# Patient Record
Sex: MALE | Race: WHITE | NOT HISPANIC OR LATINO | Employment: FULL TIME | URBAN - METROPOLITAN AREA
[De-identification: names, ages, dates, MRNs, and addresses within clinical notes are randomized per-mention and may not be internally consistent; named-entity substitution may affect disease eponyms.]

---

## 2019-02-14 ENCOUNTER — OFFICE VISIT (OUTPATIENT)
Dept: FAMILY MEDICINE CLINIC | Facility: CLINIC | Age: 29
End: 2019-02-14
Payer: COMMERCIAL

## 2019-02-14 VITALS
HEIGHT: 73 IN | SYSTOLIC BLOOD PRESSURE: 110 MMHG | OXYGEN SATURATION: 96 % | HEART RATE: 74 BPM | BODY MASS INDEX: 29.16 KG/M2 | WEIGHT: 220 LBS | DIASTOLIC BLOOD PRESSURE: 72 MMHG

## 2019-02-14 DIAGNOSIS — R06.83 SNORING: ICD-10-CM

## 2019-02-14 DIAGNOSIS — E66.3 OVERWEIGHT (BMI 25.0-29.9): ICD-10-CM

## 2019-02-14 DIAGNOSIS — Z00.00 ROUTINE GENERAL MEDICAL EXAMINATION AT A HEALTH CARE FACILITY: ICD-10-CM

## 2019-02-14 DIAGNOSIS — G25.0 BENIGN ESSENTIAL TREMOR: ICD-10-CM

## 2019-02-14 DIAGNOSIS — R22.1 LUMP ON NECK: ICD-10-CM

## 2019-02-14 DIAGNOSIS — G89.29 CHRONIC PAIN OF LEFT KNEE: Primary | ICD-10-CM

## 2019-02-14 DIAGNOSIS — Z72.0 TOBACCO USE: ICD-10-CM

## 2019-02-14 DIAGNOSIS — M25.562 CHRONIC PAIN OF LEFT KNEE: Primary | ICD-10-CM

## 2019-02-14 PROCEDURE — 99203 OFFICE O/P NEW LOW 30 MIN: CPT | Performed by: PHYSICIAN ASSISTANT

## 2019-02-14 PROCEDURE — 99395 PREV VISIT EST AGE 18-39: CPT | Performed by: PHYSICIAN ASSISTANT

## 2019-02-14 NOTE — ASSESSMENT & PLAN NOTE
Medial and lateral pain with intermittent locking for 6 months  Requested prior medical records including Xray  - MRI of left knee   Discussed that this may not be covered by insurance  - FU with orthopaedics

## 2019-02-14 NOTE — ASSESSMENT & PLAN NOTE
1 5 cm subcutaneous soft mobile and non-tender   Right of midline  - Referral for US of soft tissue on neck

## 2019-02-14 NOTE — PROGRESS NOTES
Assessment/Plan:    Chronic pain of left knee  Medial and lateral pain with intermittent locking for 6 months  Requested prior medical records including Xray  - MRI of left knee  Discussed that this may not be covered by insurance  - FU with orthopaedics    Overweight (BMI 25 0-29 9)  - Discussed importance of diet for management of weight  Encouraged both aerobic and resistance exercise 150 minutes a week  - Pt will fill out record release for prior PCP  - CBC, CMP, TSH, Lipid panel  - Return in 4 weeks to discuss labs and review prior records    Tobacco use  Pt is in the Precontemplation stage (ie, not wanting to quit)  - Educate on the negative effects of Tobaco   - Recommend quitting smoking  - Listed cessation options     Snoring  Chronic issue  Loud snoring and poor sleep quality  - Referral for Home sleep study  Benign essential tremor  Per patient, previously worked up with Neurology  - getting labs including TSH to RO thryoid    Lump on neck  1 5 cm subcutaneous soft mobile and non-tender  Right of midline  - Referral for US of soft tissue on neck    FU in 4 weeks to review labs       Discussed case with Dr Kendy Delgado    Subjective:      Patient ID: Sheila Pena is a 26 y o  male, here for an annual wellness visit and to establish care  He presents with his girlfriend  Left knee pain For 5 weeks    In [facility] in [place]  Initially started September with pop in left knee  Again noticed pain in left knee in October  Painful and swelling  His xray of left knee was normal  He deferred on getting his MRI  Diagnosed with benign essential tremor in 2012  He has a lump on the back of his neck that does not bother him and has been present for 2 years but he wants to get looked at  No change in size or pain      Previous Dental Visit: 2016 prior, no known dental issues  Previous Eye Exam: 2012 prior, no vision problems    Prior Vaccines:   - Last Tetanus vaccine: 2012    Diet: low in fruits/veg  Activity: minimal due to knee pain    The following portions of the patient's history were reviewed and updated as appropriate: allergies, current medications, past family history, past medical history, past social history, past surgical history and problem list     Review of Systems    Social History     Socioeconomic History    Marital status: Single     Spouse name: Not on file    Number of children: Not on file    Years of education: Not on file    Highest education level: Not on file   Occupational History    Not on file   Social Needs    Financial resource strain: Not on file    Food insecurity:     Worry: Not on file     Inability: Not on file    Transportation needs:     Medical: Not on file     Non-medical: Not on file   Tobacco Use    Smoking status: Current Every Day Smoker    Smokeless tobacco: Never Used    Tobacco comment: cigars <1 then one a day   Substance and Sexual Activity    Alcohol use: Yes     Frequency: Monthly or less     Drinks per session: 1 or 2     Comment: socially     Drug use: Yes     Types: Marijuana     Comment: 3 times a week    Sexual activity: Yes     Partners: Female     Birth control/protection: None   Lifestyle    Physical activity:     Days per week: Not on file     Minutes per session: Not on file    Stress: Not on file   Relationships    Social connections:     Talks on phone: Not on file     Gets together: Not on file     Attends Scientologist service: Not on file     Active member of club or organization: Not on file     Attends meetings of clubs or organizations: Not on file     Relationship status: Not on file    Intimate partner violence:     Fear of current or ex partner: Not on file     Emotionally abused: Not on file     Physically abused: Not on file     Forced sexual activity: Not on file   Other Topics Concern    Not on file   Social History Narrative    Title work         Objective:  /72   Pulse 74   Ht 6' 1" (1 854 m) Wt 99 8 kg (220 lb)   SpO2 96%   BMI 29 03 kg/m²      Physical Exam   Constitutional: He appears well-developed and well-nourished  HENT:   Head: Normocephalic and atraumatic  Right Ear: Tympanic membrane and external ear normal    Left Ear: Tympanic membrane and external ear normal    Nose: Nose normal  No rhinorrhea  Mouth/Throat: Oropharynx is clear and moist  No oropharyngeal exudate  Neck: Normal range of motion  No thyromegaly present  Cardiovascular: Normal rate, regular rhythm and normal heart sounds  Exam reveals no gallop and no friction rub  No murmur heard  Pulmonary/Chest: Effort normal and breath sounds normal  He has no wheezes  He has no rales  Abdominal: Soft  Bowel sounds are normal  He exhibits no distension  There is no tenderness  There is no rebound and no guarding  Musculoskeletal: Normal range of motion  Left knee: He exhibits normal range of motion, no swelling, no effusion, no deformity, no laceration, no LCL laxity and no bony tenderness  Tenderness found  Medial joint line and lateral joint line tenderness noted  No patellar tendon tenderness noted  Negative Lachman, Apley Distraction and Compression Test   Lymphadenopathy:        Head (right side): No submental, no submandibular, no tonsillar, no preauricular and no posterior auricular adenopathy present  Head (left side): No submental, no submandibular, no tonsillar, no preauricular and no posterior auricular adenopathy present  He has no cervical adenopathy  Psychiatric: He has a normal mood and affect  His behavior is normal  Thought content normal        BMI Counseling: Body mass index is 29 03 kg/m²  Discussed the patient's BMI with him  The BMI is above average  BMI counseling and education was provided to the patient  Nutrition recommendations include reducing portion sizes, 3-5 servings of fruits/vegetables daily, moderation in carbohydrate intake and increasing intake of lean protein  Exercise recommendations include moderate aerobic physical activity for 150 minutes/week

## 2019-03-10 ENCOUNTER — HOSPITAL ENCOUNTER (OUTPATIENT)
Dept: MRI IMAGING | Facility: HOSPITAL | Age: 29
Discharge: HOME | End: 2019-03-10
Payer: COMMERCIAL

## 2019-03-10 DIAGNOSIS — G89.29 CHRONIC PAIN OF LEFT KNEE: ICD-10-CM

## 2019-03-10 DIAGNOSIS — M25.562 CHRONIC PAIN OF LEFT KNEE: ICD-10-CM

## 2019-03-10 PROCEDURE — 73721 MRI JNT OF LWR EXTRE W/O DYE: CPT

## 2019-03-13 ENCOUNTER — APPOINTMENT (OUTPATIENT)
Dept: LAB | Facility: CLINIC | Age: 29
End: 2019-03-13
Payer: COMMERCIAL

## 2019-03-13 VITALS
HEART RATE: 99 BPM | BODY MASS INDEX: 28.1 KG/M2 | HEIGHT: 73 IN | WEIGHT: 212 LBS | SYSTOLIC BLOOD PRESSURE: 119 MMHG | DIASTOLIC BLOOD PRESSURE: 75 MMHG

## 2019-03-13 DIAGNOSIS — G89.29 CHRONIC PAIN OF LEFT KNEE: ICD-10-CM

## 2019-03-13 DIAGNOSIS — S83.242A OTHER TEAR OF MEDIAL MENISCUS, CURRENT INJURY, LEFT KNEE, INITIAL ENCOUNTER: Primary | ICD-10-CM

## 2019-03-13 DIAGNOSIS — Z00.00 ROUTINE GENERAL MEDICAL EXAMINATION AT A HEALTH CARE FACILITY: ICD-10-CM

## 2019-03-13 DIAGNOSIS — E66.3 OVERWEIGHT (BMI 25.0-29.9): ICD-10-CM

## 2019-03-13 DIAGNOSIS — M25.562 CHRONIC PAIN OF LEFT KNEE: ICD-10-CM

## 2019-03-13 DIAGNOSIS — S83.242A OTHER TEAR OF MEDIAL MENISCUS, CURRENT INJURY, LEFT KNEE, INITIAL ENCOUNTER: ICD-10-CM

## 2019-03-13 LAB
ALBUMIN SERPL BCP-MCNC: 4.2 G/DL (ref 3.5–5)
ALP SERPL-CCNC: 74 U/L (ref 46–116)
ALT SERPL W P-5'-P-CCNC: 53 U/L (ref 12–78)
ANION GAP SERPL CALCULATED.3IONS-SCNC: 10 MMOL/L (ref 4–13)
AST SERPL W P-5'-P-CCNC: 26 U/L (ref 5–45)
BASOPHILS # BLD AUTO: 0.04 THOUSANDS/ΜL (ref 0–0.1)
BASOPHILS NFR BLD AUTO: 1 % (ref 0–1)
BILIRUB SERPL-MCNC: 0.3 MG/DL (ref 0.2–1)
BUN SERPL-MCNC: 10 MG/DL (ref 5–25)
CALCIUM SERPL-MCNC: 9.5 MG/DL (ref 8.3–10.1)
CHLORIDE SERPL-SCNC: 101 MMOL/L (ref 100–108)
CO2 SERPL-SCNC: 28 MMOL/L (ref 21–32)
CREAT SERPL-MCNC: 1.02 MG/DL (ref 0.6–1.3)
EOSINOPHIL # BLD AUTO: 0.06 THOUSAND/ΜL (ref 0–0.61)
EOSINOPHIL NFR BLD AUTO: 1 % (ref 0–6)
ERYTHROCYTE [DISTWIDTH] IN BLOOD BY AUTOMATED COUNT: 12 % (ref 11.6–15.1)
GFR SERPL CREATININE-BSD FRML MDRD: 98 ML/MIN/1.73SQ M
GLUCOSE SERPL-MCNC: 85 MG/DL (ref 65–140)
HCT VFR BLD AUTO: 47.6 % (ref 36.5–49.3)
HGB BLD-MCNC: 16.2 G/DL (ref 12–17)
IMM GRANULOCYTES # BLD AUTO: 0.03 THOUSAND/UL (ref 0–0.2)
IMM GRANULOCYTES NFR BLD AUTO: 0 % (ref 0–2)
LYMPHOCYTES # BLD AUTO: 2.73 THOUSANDS/ΜL (ref 0.6–4.47)
LYMPHOCYTES NFR BLD AUTO: 35 % (ref 14–44)
MCH RBC QN AUTO: 29.3 PG (ref 26.8–34.3)
MCHC RBC AUTO-ENTMCNC: 34 G/DL (ref 31.4–37.4)
MCV RBC AUTO: 86 FL (ref 82–98)
MONOCYTES # BLD AUTO: 0.54 THOUSAND/ΜL (ref 0.17–1.22)
MONOCYTES NFR BLD AUTO: 7 % (ref 4–12)
NEUTROPHILS # BLD AUTO: 4.32 THOUSANDS/ΜL (ref 1.85–7.62)
NEUTS SEG NFR BLD AUTO: 56 % (ref 43–75)
NRBC BLD AUTO-RTO: 0 /100 WBCS
PLATELET # BLD AUTO: 288 THOUSANDS/UL (ref 149–390)
PMV BLD AUTO: 8.9 FL (ref 8.9–12.7)
POTASSIUM SERPL-SCNC: 4.1 MMOL/L (ref 3.5–5.3)
PROT SERPL-MCNC: 8.3 G/DL (ref 6.4–8.2)
RBC # BLD AUTO: 5.52 MILLION/UL (ref 3.88–5.62)
SODIUM SERPL-SCNC: 139 MMOL/L (ref 136–145)
T4 FREE SERPL-MCNC: 0.99 NG/DL (ref 0.76–1.46)
TSH SERPL DL<=0.05 MIU/L-ACNC: 3.74 UIU/ML (ref 0.36–3.74)
WBC # BLD AUTO: 7.72 THOUSAND/UL (ref 4.31–10.16)

## 2019-03-13 PROCEDURE — 84443 ASSAY THYROID STIM HORMONE: CPT

## 2019-03-13 PROCEDURE — 80053 COMPREHEN METABOLIC PANEL: CPT

## 2019-03-13 PROCEDURE — 85025 COMPLETE CBC W/AUTO DIFF WBC: CPT

## 2019-03-13 PROCEDURE — 36415 COLL VENOUS BLD VENIPUNCTURE: CPT

## 2019-03-13 PROCEDURE — 84439 ASSAY OF FREE THYROXINE: CPT

## 2019-03-13 PROCEDURE — 99204 OFFICE O/P NEW MOD 45 MIN: CPT | Performed by: ORTHOPAEDIC SURGERY

## 2019-03-13 RX ORDER — CEFAZOLIN SODIUM 2 G/50ML
2000 SOLUTION INTRAVENOUS ONCE
Status: CANCELLED | OUTPATIENT
Start: 2019-03-13 | End: 2019-03-13

## 2019-03-13 NOTE — H&P (VIEW-ONLY)
Patient Name:  Raya Rehman  MRN:  [id number]    Assessment & Plan     Left knee medial meniscus tear  1  Patient has persistent pain instability which affects his ability to perform activities of daily living an prevents him from achieving as desired lifestyle  Explained the procedure of a left knee arthroscopic partial medial meniscectomy detail as well as risks and benefits  He has elected to proceed  2  Follow up 10-14 days postoperatively  Chief Complaint     Left knee pain      History of the Present Illness     Raya Rehman is a 26 y o  male seen in consultation by orthopedics requested by Montserrat Piper for evaluation of patient's left knee  Patient began noticing left knee pain in September after twisting his knee while getting into a car  He also notes an increase in his pain while playing basketball in October when he landed awkwardly on his left knee  Initially after this injury he noted significant swelling  Since then he notes primarily medial based pain worse with walking up and down steps as well as inclines  He notes weakness and instability  He has been unable to play basketball due to pain and instability  No numbness or tingling  No fevers or chills  He did have an MRI and is here to review the results  Physical Exam     /75   Pulse 99   Ht 6' 1" (1 854 m)   Wt 96 2 kg (212 lb)   BMI 27 97 kg/m²     Left knee:  No gross deformity  Skin intact  No erythema ecchymosis or swelling  Small effusion  Tenderness to palpation posterior aspect of medial joint line  Range of motion includes full extension and flexion of 120°  Discomfort noted with terminal flexion  Stable to varus and valgus stress  Stable Lachman test   Positive Eugenio's test   Negative patellar apprehension test   Sensation intact left lower extremity  Skin warm and well perfused  Eyes:  Anicteric sclerae  Neck:  Supple  Lungs:  Unlabored breathing    Cardiovascular:  Capillary refill is less than 2 seconds  Skin:  Intact without erythema  Neurologic:  Sensation intact to light touch  Psychiatric:  Mood and affect are appropriate  Data Review     I have personally reviewed pertinent films in PACS, and my interpretation follows:    MRI of the left knee from 3/10/19 reveals a horizontal tear of the medial meniscus body and posterior horn with flipped fragment in the posterior compartment  History reviewed  No pertinent past medical history  Past Surgical History:   Procedure Laterality Date    WISDOM TOOTH EXTRACTION         No Known Allergies    No current outpatient medications on file prior to visit  No current facility-administered medications on file prior to visit  Social History     Tobacco Use    Smoking status: Current Every Day Smoker    Smokeless tobacco: Never Used    Tobacco comment: cigars <1 then one a day   Substance Use Topics    Alcohol use: Yes     Frequency: Monthly or less     Drinks per session: 1 or 2     Comment: socially     Drug use: Yes     Types: Marijuana     Comment: 3 times a week       Family History   Problem Relation Age of Onset    No Known Problems Mother     Hypertension Father     Lupus Father     No Known Problems Sister     No Known Problems Sister     Leukemia Maternal Grandfather     Leukemia Cousin        Review of Systems     As stated in the HPI  All other systems were reviewed and are negative        Scribe Attestation    I,:   Mann Livingston PA-C am acting as a scribe while in the presence of the attending physician :        I,:   Luis France MD personally performed the services described in this documentation    as scribed in my presence :

## 2019-03-13 NOTE — PROGRESS NOTES
Patient Name:  Amanda Dorantes  MRN:  [id number]    Assessment & Plan     Left knee medial meniscus tear  1  Patient has persistent pain instability which affects his ability to perform activities of daily living an prevents him from achieving as desired lifestyle  Explained the procedure of a left knee arthroscopic partial medial meniscectomy detail as well as risks and benefits  He has elected to proceed  2  Follow up 10-14 days postoperatively  Chief Complaint     Left knee pain      History of the Present Illness     Amanda Dorantes is a 26 y o  male seen in consultation by orthopedics requested by Walker Johnson for evaluation of patient's left knee  Patient began noticing left knee pain in September after twisting his knee while getting into a car  He also notes an increase in his pain while playing basketball in October when he landed awkwardly on his left knee  Initially after this injury he noted significant swelling  Since then he notes primarily medial based pain worse with walking up and down steps as well as inclines  He notes weakness and instability  He has been unable to play basketball due to pain and instability  No numbness or tingling  No fevers or chills  He did have an MRI and is here to review the results  Physical Exam     /75   Pulse 99   Ht 6' 1" (1 854 m)   Wt 96 2 kg (212 lb)   BMI 27 97 kg/m²     Left knee:  No gross deformity  Skin intact  No erythema ecchymosis or swelling  Small effusion  Tenderness to palpation posterior aspect of medial joint line  Range of motion includes full extension and flexion of 120°  Discomfort noted with terminal flexion  Stable to varus and valgus stress  Stable Lachman test   Positive Eugenio's test   Negative patellar apprehension test   Sensation intact left lower extremity  Skin warm and well perfused  Eyes:  Anicteric sclerae  Neck:  Supple  Lungs:  Unlabored breathing    Cardiovascular:  Capillary refill is less than 2 seconds  Skin:  Intact without erythema  Neurologic:  Sensation intact to light touch  Psychiatric:  Mood and affect are appropriate  Data Review     I have personally reviewed pertinent films in PACS, and my interpretation follows:    MRI of the left knee from 3/10/19 reveals a horizontal tear of the medial meniscus body and posterior horn with flipped fragment in the posterior compartment  History reviewed  No pertinent past medical history  Past Surgical History:   Procedure Laterality Date    WISDOM TOOTH EXTRACTION         No Known Allergies    No current outpatient medications on file prior to visit  No current facility-administered medications on file prior to visit  Social History     Tobacco Use    Smoking status: Current Every Day Smoker    Smokeless tobacco: Never Used    Tobacco comment: cigars <1 then one a day   Substance Use Topics    Alcohol use: Yes     Frequency: Monthly or less     Drinks per session: 1 or 2     Comment: socially     Drug use: Yes     Types: Marijuana     Comment: 3 times a week       Family History   Problem Relation Age of Onset    No Known Problems Mother     Hypertension Father     Lupus Father     No Known Problems Sister     No Known Problems Sister     Leukemia Maternal Grandfather     Leukemia Cousin        Review of Systems     As stated in the HPI  All other systems were reviewed and are negative        Scribe Attestation    I,:   Ivania Lin PA-C am acting as a scribe while in the presence of the attending physician :        I,:   Amie Mckay MD personally performed the services described in this documentation    as scribed in my presence :

## 2019-03-14 ENCOUNTER — ANESTHESIA EVENT (OUTPATIENT)
Dept: PERIOP | Facility: AMBULARY SURGERY CENTER | Age: 29
End: 2019-03-14
Payer: COMMERCIAL

## 2019-03-15 NOTE — PRE-PROCEDURE INSTRUCTIONS
No outpatient medications have been marked as taking for the 3/28/19 encounter [facility] Encounter)  Pre op and bathing instructions reviewed   Pt has hibiclens

## 2019-03-28 ENCOUNTER — HOSPITAL ENCOUNTER (OUTPATIENT)
Facility: AMBULARY SURGERY CENTER | Age: 29
Setting detail: OUTPATIENT SURGERY
Discharge: HOME | End: 2019-03-28
Attending: ORTHOPAEDIC SURGERY | Admitting: ORTHOPAEDIC SURGERY
Payer: COMMERCIAL

## 2019-03-28 ENCOUNTER — ANESTHESIA (OUTPATIENT)
Dept: PERIOP | Facility: AMBULARY SURGERY CENTER | Age: 29
End: 2019-03-28
Payer: COMMERCIAL

## 2019-03-28 VITALS
HEART RATE: 84 BPM | SYSTOLIC BLOOD PRESSURE: 123 MMHG | OXYGEN SATURATION: 97 % | HEIGHT: 73 IN | WEIGHT: 210 LBS | TEMPERATURE: 97.4 F | BODY MASS INDEX: 27.83 KG/M2 | RESPIRATION RATE: 16 BRPM | DIASTOLIC BLOOD PRESSURE: 70 MMHG

## 2019-03-28 DIAGNOSIS — S83.232A COMPLEX TEAR OF MEDIAL MENISCUS OF LEFT KNEE AS CURRENT INJURY, INITIAL ENCOUNTER: Primary | ICD-10-CM

## 2019-03-28 PROCEDURE — 29881 ARTHRS KNE SRG MNISECTMY M/L: CPT | Performed by: ORTHOPAEDIC SURGERY

## 2019-03-28 RX ORDER — MIDAZOLAM HYDROCHLORIDE 1 MG/ML
INJECTION INTRAMUSCULAR; INTRAVENOUS AS NEEDED
Status: DISCONTINUED | OUTPATIENT
Start: 2019-03-28 | End: 2019-03-28 | Stop reason: SURG

## 2019-03-28 RX ORDER — OXYCODONE HYDROCHLORIDE 5 MG/1
10 TABLET ORAL EVERY 4 HOURS PRN
Status: DISCONTINUED | OUTPATIENT
Start: 2019-03-28 | End: 2019-03-28 | Stop reason: HOSPADM

## 2019-03-28 RX ORDER — KETOROLAC TROMETHAMINE 30 MG/ML
INJECTION, SOLUTION INTRAMUSCULAR; INTRAVENOUS AS NEEDED
Status: DISCONTINUED | OUTPATIENT
Start: 2019-03-28 | End: 2019-03-28 | Stop reason: SURG

## 2019-03-28 RX ORDER — ACETAMINOPHEN 325 MG/1
650 TABLET ORAL EVERY 4 HOURS PRN
Status: DISCONTINUED | OUTPATIENT
Start: 2019-03-28 | End: 2019-03-28 | Stop reason: HOSPADM

## 2019-03-28 RX ORDER — PROPOFOL 10 MG/ML
INJECTION, EMULSION INTRAVENOUS AS NEEDED
Status: DISCONTINUED | OUTPATIENT
Start: 2019-03-28 | End: 2019-03-28 | Stop reason: SURG

## 2019-03-28 RX ORDER — FENTANYL CITRATE 50 UG/ML
INJECTION, SOLUTION INTRAMUSCULAR; INTRAVENOUS AS NEEDED
Status: DISCONTINUED | OUTPATIENT
Start: 2019-03-28 | End: 2019-03-28 | Stop reason: SURG

## 2019-03-28 RX ORDER — LIDOCAINE HYDROCHLORIDE 10 MG/ML
INJECTION, SOLUTION INFILTRATION; PERINEURAL AS NEEDED
Status: DISCONTINUED | OUTPATIENT
Start: 2019-03-28 | End: 2019-03-28 | Stop reason: SURG

## 2019-03-28 RX ORDER — ONDANSETRON 2 MG/ML
4 INJECTION INTRAMUSCULAR; INTRAVENOUS EVERY 8 HOURS PRN
Status: DISCONTINUED | OUTPATIENT
Start: 2019-03-28 | End: 2019-03-28 | Stop reason: HOSPADM

## 2019-03-28 RX ORDER — ONDANSETRON 2 MG/ML
4 INJECTION INTRAMUSCULAR; INTRAVENOUS ONCE AS NEEDED
Status: DISCONTINUED | OUTPATIENT
Start: 2019-03-28 | End: 2019-03-28 | Stop reason: HOSPADM

## 2019-03-28 RX ORDER — SODIUM CHLORIDE 9 MG/ML
125 INJECTION, SOLUTION INTRAVENOUS CONTINUOUS
Status: DISCONTINUED | OUTPATIENT
Start: 2019-03-28 | End: 2019-03-28 | Stop reason: HOSPADM

## 2019-03-28 RX ORDER — CEFAZOLIN SODIUM 2 G/50ML
SOLUTION INTRAVENOUS AS NEEDED
Status: DISCONTINUED | OUTPATIENT
Start: 2019-03-28 | End: 2019-03-28 | Stop reason: SURG

## 2019-03-28 RX ORDER — METOCLOPRAMIDE HYDROCHLORIDE 5 MG/ML
10 INJECTION INTRAMUSCULAR; INTRAVENOUS ONCE AS NEEDED
Status: DISCONTINUED | OUTPATIENT
Start: 2019-03-28 | End: 2019-03-28 | Stop reason: HOSPADM

## 2019-03-28 RX ORDER — ONDANSETRON 2 MG/ML
INJECTION INTRAMUSCULAR; INTRAVENOUS AS NEEDED
Status: DISCONTINUED | OUTPATIENT
Start: 2019-03-28 | End: 2019-03-28 | Stop reason: SURG

## 2019-03-28 RX ORDER — FENTANYL CITRATE/PF 50 MCG/ML
25 SYRINGE (ML) INJECTION
Status: DISCONTINUED | OUTPATIENT
Start: 2019-03-28 | End: 2019-03-28 | Stop reason: HOSPADM

## 2019-03-28 RX ORDER — DEXAMETHASONE SODIUM PHOSPHATE 10 MG/ML
INJECTION, SOLUTION INTRAMUSCULAR; INTRAVENOUS AS NEEDED
Status: DISCONTINUED | OUTPATIENT
Start: 2019-03-28 | End: 2019-03-28 | Stop reason: SURG

## 2019-03-28 RX ORDER — CEFAZOLIN SODIUM 2 G/50ML
2000 SOLUTION INTRAVENOUS ONCE
Status: DISCONTINUED | OUTPATIENT
Start: 2019-03-28 | End: 2019-03-28 | Stop reason: HOSPADM

## 2019-03-28 RX ORDER — OXYCODONE HYDROCHLORIDE 5 MG/1
5 TABLET ORAL EVERY 4 HOURS PRN
Qty: 25 TABLET | Refills: 0 | Status: SHIPPED | OUTPATIENT
Start: 2019-03-28 | End: 2019-09-11

## 2019-03-28 RX ADMIN — FENTANYL CITRATE 100 MCG: 50 INJECTION, SOLUTION INTRAMUSCULAR; INTRAVENOUS at 09:52

## 2019-03-28 RX ADMIN — KETOROLAC TROMETHAMINE 30 MG: 30 INJECTION, SOLUTION INTRAMUSCULAR at 10:28

## 2019-03-28 RX ADMIN — MIDAZOLAM HYDROCHLORIDE 2 MG: 1 INJECTION, SOLUTION INTRAMUSCULAR; INTRAVENOUS at 09:46

## 2019-03-28 RX ADMIN — PROPOFOL 200 MG: 10 INJECTION, EMULSION INTRAVENOUS at 09:48

## 2019-03-28 RX ADMIN — OXYCODONE HYDROCHLORIDE 10 MG: 5 TABLET ORAL at 11:26

## 2019-03-28 RX ADMIN — SODIUM CHLORIDE: 0.9 INJECTION, SOLUTION INTRAVENOUS at 09:46

## 2019-03-28 RX ADMIN — LIDOCAINE HYDROCHLORIDE ANHYDROUS 50 MG: 10 INJECTION, SOLUTION INFILTRATION at 09:47

## 2019-03-28 RX ADMIN — ONDANSETRON 4 MG: 2 INJECTION INTRAMUSCULAR; INTRAVENOUS at 09:51

## 2019-03-28 RX ADMIN — FENTANYL CITRATE 25 MCG: 50 INJECTION, SOLUTION INTRAMUSCULAR; INTRAVENOUS at 10:49

## 2019-03-28 RX ADMIN — CEFAZOLIN SODIUM 2000 MG: 2 SOLUTION INTRAVENOUS at 09:46

## 2019-03-28 RX ADMIN — DEXAMETHASONE SODIUM PHOSPHATE 5 MG: 10 INJECTION, SOLUTION INTRAMUSCULAR; INTRAVENOUS at 09:51

## 2019-03-28 RX ADMIN — FENTANYL CITRATE 25 MCG: 50 INJECTION, SOLUTION INTRAMUSCULAR; INTRAVENOUS at 10:46

## 2019-03-28 NOTE — OP NOTE
OPERATIVE REPORT  PATIENT NAME: Mikayla Schneider    :  1990  MRN: [id number]  Pt Location: AN  OR ROOM 06    SURGERY DATE: 3/28/2019    Surgeon(s) and Role:     * Cira Bal MD - Primary    Pre-Op Diagnosis Codes:     * Complex tear of medial meniscus of left knee as current injury [S83 232A]    Post-Op Diagnosis Codes:     * Complex tear of medial meniscus of left knee as current injury [S83 232A]    Procedure(s) (LRB):  LEFT KNEE ARTHROSCOPIC PARTIAL MEDIAL MENISCECTOMY    Specimen(s):  * No specimens in log *    Estimated Blood Loss:   Minimal    Drains:  * No LDAs found *    Anesthesia Type:   General    Complications:   None    Procedure and Technique:  The patient was identified in the pre-operative holding area, and the surgical site was marked by the surgeon  The patient was transported to the operating room and transferred to the OR table in the supine position  General anesthesia was administered, and the patient was intubated  The left knee was prepped and draped in the usual sterile fashion  Prophylactic antibiotics were given within 1 hour of incision  Following a surgical timeout, the portal sites were injected with 10 mL of a 1:1 mixture of 0 25% bupivacaine and 1% lidocaine with epinephrine  An anterolateral portal was established with the 15-blade scalpel  The 4 mm 30-degree arthroscopic camera was placed in the medial compartment  An anteromedial portal was established under direct visualization  The medial meniscus demonstrated a horizontal undersurface tear of the posterior horn and body with a large flipped fragment in the posterior compartment  The articular surface of the medial femoral condyle was intact  The articular surface of the medial tibial plateau was intact  Partial medial meniscectomy was performed with the basket forceps and motorized shaver  The medial meniscus was debrided back to a stable margin  The anterior cruciate ligament was intact      The lateral meniscus was intact  The articular surface of the lateral femoral condyle was intact  The articular surface of the lateral tibial plateau was intact  The articular surface of the patella was intact  The articular surface of the trochlea was intact  The knee was irrigated extensively with the arthroscopic irrigation fluid  The portal sites were closed with simple buried 3-0 Monocryl suture, and adhesive strips were applied to reinforce the skin closure  The knee was injected with 20 mL of the local anesthetic mixture  The wounds were dressed with 4 x 4 gauze, cotton padding, and an elastic bandage  Anesthesia was reversed, and the patient was extubated  The patient was transferred to the stretcher and transported to the recovery area in stable condition      Patient Disposition:  PACU  and extubated and stable    SIGNATURE: Meño Damian MD  DATE: March 28, 2019  TIME: 10:33 AM

## 2019-03-28 NOTE — ANESTHESIA PREPROCEDURE EVALUATION
Review of Systems/Medical History  Patient summary reviewed  Chart reviewed  No history of anesthetic complications     Cardiovascular  Negative cardio ROS Exercise tolerance (METS): >4,     Pulmonary  Smoker (occasional) ,   Comment: Marijuana use     GI/Hepatic    GERD (rare with large meals) ,        Negative  ROS        Endo/Other  Negative endo/other ROS      GYN       Hematology  Negative hematology ROS      Musculoskeletal  Negative musculoskeletal ROS        Neurology  Negative neurology ROS      Psychology   Negative psychology ROS            Physical Exam    Airway    Mallampati score: II  TM Distance: >3 FB  Neck ROM: full     Dental   No notable dental hx     Cardiovascular  Comment: Negative ROS,     Pulmonary      Other Findings      Lab Results   Component Value Date    WBC 7 72 03/13/2019    HGB 16 2 03/13/2019     03/13/2019     Lab Results   Component Value Date    K 4 1 03/13/2019    BUN 10 03/13/2019    CREATININE 1 02 03/13/2019     Anesthesia Plan  ASA Score- 1     Anesthesia Type- general with ASA Monitors  Additional Monitors:   Airway Plan: LMA  Plan Factors-    Induction- intravenous  Postoperative Plan-     Informed Consent- Anesthetic plan and risks discussed with patient and spouse  I personally reviewed this patient with the CRNA  Discussed and agreed on the Anesthesia Plan with the CRNA  Fariba Alcantar

## 2019-03-28 NOTE — ANESTHESIA POSTPROCEDURE EVALUATION
Post-Op Assessment Note    CV Status:  Stable  Pain Score: 0    Pain management: adequate     Mental Status:  Alert and awake   Hydration Status:  Euvolemic   PONV Controlled:  Controlled   Airway Patency:  Patent   Post Op Vitals Reviewed: Yes      Staff: CRNA           BP   114/59   Temp   98   Pulse  96   Resp   14   SpO2   96

## 2019-04-09 VITALS
HEIGHT: 73 IN | WEIGHT: 205 LBS | DIASTOLIC BLOOD PRESSURE: 74 MMHG | BODY MASS INDEX: 27.17 KG/M2 | HEART RATE: 80 BPM | SYSTOLIC BLOOD PRESSURE: 118 MMHG

## 2019-04-09 DIAGNOSIS — S83.232A COMPLEX TEAR OF MEDIAL MENISCUS OF LEFT KNEE AS CURRENT INJURY, INITIAL ENCOUNTER: Primary | ICD-10-CM

## 2019-04-09 PROCEDURE — 99024 POSTOP FOLLOW-UP VISIT: CPT | Performed by: PHYSICIAN ASSISTANT

## 2019-08-20 ENCOUNTER — HOSPITAL ENCOUNTER (OUTPATIENT)
Dept: ULTRASOUND IMAGING | Facility: HOSPITAL | Age: 29
Discharge: HOME | End: 2019-08-20
Payer: COMMERCIAL

## 2019-08-20 DIAGNOSIS — R22.1 LUMP ON NECK: ICD-10-CM

## 2019-08-20 DIAGNOSIS — L72.3 SEBACEOUS CYST: Primary | ICD-10-CM

## 2019-08-20 PROCEDURE — 76536 US EXAM OF HEAD AND NECK: CPT

## 2019-09-05 NOTE — H&P (VIEW-ONLY)
Assessment/Plan:    Diagnoses and all orders for this visit:    Skin lesion of left arm    Sebaceous cyst  -     Ambulatory referral to General Surgery    Risks and benefits for left forearm lesion as well as neck cyst excision were discussed and he agrees to proceed  Will perform this under local     Subjective:      Patient ID: John Roy is a 26 y o  male  Patient presents for evaluation of a cyst on the back of his neck  States he has had the cyst for 2 years  Denies pain or size change  Also pointed out 1 cm lesion of his left forearm  Ultrasound head and neck 8/20/2019  IMPRESSION:   Patient's palpable abnormality in the right posterior neck corresponds to a mildly complex oval lesion centered within the subcutaneous fat measuring 1 6 x 1 6 x 0 9 cm, likely representing a complex sebaceous cyst          The following portions of the patient's history were reviewed and updated as appropriate:     He  has no past medical history on file  He  has a past surgical history that includes [place] tooth extraction and pr knee scope,med/lat menisectomy (Left, 3/28/2019)  His family history includes Hypertension in his father; Leukemia in his cousin and maternal grandfather; Lupus in his father; No Known Problems in his mother, sister, and sister  He  reports that he has been smoking cigars  He has never used smokeless tobacco  He reports that he drinks alcohol  He reports that he has current or past drug history  Drug: Marijuana  Current Outpatient Medications   Medication Sig Dispense Refill    oxyCODONE (ROXICODONE) 5 mg immediate release tablet Take 1 tablet (5 mg total) by mouth every 4 (four) hours as needed for severe pain 25 tablet 0     No current facility-administered medications for this visit  He has No Known Allergies       Review of Systems      Objective:      /77 (BP Location: Left arm, Patient Position: Sitting)   Pulse 79   Resp 12   Ht 6' 1" (1 854 m)   Wt 95 3 kg (210 lb)   BMI 27 71 kg/m²          Physical Exam   Constitutional: He appears well-developed and well-nourished  Eyes: EOM are normal    Neck: Neck supple  Cardiovascular: Normal rate and regular rhythm  Pulmonary/Chest: Effort normal and breath sounds normal    Skin: Skin is warm and dry  2 cm posterior neck cyst   1 cm left forearm skin lesion

## 2019-09-09 PROBLEM — D36.7 DERMOID CYST OF NECK: Status: ACTIVE | Noted: 2019-09-09

## 2019-09-09 PROBLEM — L98.9 ARM SKIN LESION, LEFT: Status: ACTIVE | Noted: 2019-09-09

## 2019-09-11 NOTE — PRE-PROCEDURE INSTRUCTIONS
No outpatient medications have been marked as taking for [facility]      Pre op and showering instructions reviewed-Patient has hibiclens

## 2019-09-19 ENCOUNTER — HOSPITAL ENCOUNTER (OUTPATIENT)
Facility: HOSPITAL | Age: 29
Setting detail: OUTPATIENT SURGERY
Discharge: HOME | End: 2019-09-19
Attending: SURGERY | Admitting: SURGERY
Payer: COMMERCIAL

## 2019-09-19 VITALS
HEART RATE: 57 BPM | BODY MASS INDEX: 27.83 KG/M2 | RESPIRATION RATE: 17 BRPM | DIASTOLIC BLOOD PRESSURE: 64 MMHG | SYSTOLIC BLOOD PRESSURE: 134 MMHG | OXYGEN SATURATION: 97 % | HEIGHT: 73 IN | WEIGHT: 210 LBS | TEMPERATURE: 98.7 F

## 2019-09-19 DIAGNOSIS — L98.9 ARM SKIN LESION, LEFT: ICD-10-CM

## 2019-09-19 DIAGNOSIS — D36.7 DERMOID CYST OF NECK: ICD-10-CM

## 2019-09-19 PROCEDURE — 11401 EXC TR-EXT B9+MARG 0.6-1 CM: CPT | Performed by: SURGERY

## 2019-09-19 PROCEDURE — 88305 TISSUE EXAM BY PATHOLOGIST: CPT | Performed by: PATHOLOGY

## 2019-09-19 PROCEDURE — 88304 TISSUE EXAM BY PATHOLOGIST: CPT | Performed by: PATHOLOGY

## 2019-09-19 PROCEDURE — 21930 EXC BACK LES SC < 3 CM: CPT | Performed by: SURGERY

## 2019-09-19 RX ORDER — LIDOCAINE HYDROCHLORIDE AND EPINEPHRINE 10; 10 MG/ML; UG/ML
INJECTION, SOLUTION INFILTRATION; PERINEURAL AS NEEDED
Status: DISCONTINUED | OUTPATIENT
Start: 2019-09-19 | End: 2019-09-19 | Stop reason: HOSPADM

## 2019-09-19 NOTE — OP NOTE
OPERATIVE REPORT  PATIENT NAME: Rell Chavez    :  1990  MRN: [id number]  Pt Location: AN OR ROOM 02    SURGERY DATE: 2019    Surgeon(s) and Role:     * Edward Lara DO - Primary    Preop Diagnosis:  Arm skin lesion, left [L98 9]  Dermoid cyst of neck [D36 7]    Post-Op Diagnosis Codes:     * Arm skin lesion, left [L98 9]     * Dermoid cyst of neck [D36 7]    Procedure(s) (LRB):  FOREARM LESION EXCISION BIOPSY (Left)  NECK LESION/MASS EXCISION BIOPSY (N/A)    Specimen(s):  ID Type Source Tests Collected by Time Destination   1 : Left forearm Lesion Tissue Arm, Left TISSUE EXAM Edward Lara DO 2019 1008    2 : Neck Lesion Tissue Neck TISSUE EXAM Edward Lara DO 2019 1009        Estimated Blood Loss:   Minimal    Drains:  * No LDAs found *    Anesthesia Type:   Local    Operative Indications:  Arm skin lesion, left [L98 9]  Dermoid cyst of neck [D36 7]      Operative Findings:  2 cm posterior neck cyst   1 cm left forearm lesion    Complications:   None    Procedure and Technique:   patient was brought into the operative suite and placed prone on the OR table  Were able to expose both neck and left forearm areas for excision of their respective lesions  Both areas were prepped and draped with alcohol  Time-out was performed was assured that the prep was dry  Local 1% lidocaine with epinephrine was instilled in the skin subcutaneous tissues of the neck lesion  Elliptical skin incision was made and the cyst was removed using scalpel blade  No specific margin was taken  Few bleeding points were controlled hot cautery  Three 0 Vicryl was used to close subcutaneous tissues  Four Monocryl was used to close skin in a subcuticular fashion  Another time-out was performed in the left forearm lesion was addressed  Local was instilled the skin the surrounding subcutaneous tissues  Elliptical skin incision was made with no specific margin    Full-thickness skin lesion was removed down the subcutaneous tissues with the scalpel blade  Few bleeding points were controlled hot cautery  Three 0 Vicryl was used to close subcutaneous tissues  Four Monocryl was used to close skin in a subcuticular fashion  Wounds washed and dried and sterile skin glue was applied to both  He tolerated well and was returned to the recovery area in stable condition     I was present for the entire procedure    Patient Disposition:  PACU     SIGNATURE: Leeanna Rabago DO  DATE: September 19, 2019  TIME: 10:22 AM

## 2019-09-19 NOTE — DISCHARGE INSTRUCTIONS
Apply ice as needed to wounds     may apply a bandage if he noticed drainage     may shower     call Dr Mike Mars with questions or concerns    [phone]

## 2019-11-05 ENCOUNTER — OFFICE VISIT (OUTPATIENT)
Dept: FAMILY MEDICINE CLINIC | Facility: CLINIC | Age: 29
End: 2019-11-05
Payer: COMMERCIAL

## 2019-11-05 VITALS
WEIGHT: 211 LBS | SYSTOLIC BLOOD PRESSURE: 120 MMHG | DIASTOLIC BLOOD PRESSURE: 70 MMHG | HEIGHT: 73 IN | OXYGEN SATURATION: 98 % | RESPIRATION RATE: 16 BRPM | HEART RATE: 78 BPM | BODY MASS INDEX: 27.96 KG/M2

## 2019-11-05 DIAGNOSIS — L98.9 ARM SKIN LESION, LEFT: Primary | ICD-10-CM

## 2019-11-05 PROCEDURE — 99213 OFFICE O/P EST LOW 20 MIN: CPT | Performed by: PHYSICIAN ASSISTANT

## 2019-11-05 PROCEDURE — 3008F BODY MASS INDEX DOCD: CPT | Performed by: PHYSICIAN ASSISTANT

## 2019-11-05 NOTE — PROGRESS NOTES
Assessment/Plan:     Diagnoses and all orders for this visit:    Arm skin lesion, left  Minimal irritation of well healing surgical incision on left forearm  6 weeks post-operative  No evidence of cellulitis or abscess  - Directed to continue covering incision while cleaning to prevent exposure to irritating chemicals  - Directed to RTC with any development of redness, return of discharge, fever or chills  Directed to FU for annual physical in 3 months          Subjective:    Patient ID: Otto Tamayo is a 26 y o  male  Pt is presenting today for Concern for infection of left forearm incision site  He had a benign dermatofibroma removed from forearm on 6 weeks ago with no complications  3 days ago he noticed slight tenderness with trace white drainage from incision site  He has been applying topical neosporin and covering it with bandages since this occurred  He cleaned a house the day prior to the tenderness  He is right hand dominant  Denies any fever, chills or localized redness  The following portions of the patient's history were reviewed and updated as appropriate: allergies, current medications and problem list     Review of Systems   Constitutional: Negative for activity change, fatigue, fever and unexpected weight change  HENT: Negative for rhinorrhea and sore throat  Respiratory: Negative for cough, shortness of breath and wheezing  Cardiovascular: Negative for chest pain, palpitations and leg swelling  Gastrointestinal: Negative for constipation, diarrhea, nausea and vomiting  Musculoskeletal: Negative for back pain, neck pain and neck stiffness  Skin: Negative for rash  Objective:  /70   Pulse 78   Resp 16   Ht 6' 1" (1 854 m)   Wt 95 7 kg (211 lb)   SpO2 98%   BMI 27 84 kg/m²      Physical Exam   Constitutional: He is oriented to person, place, and time  He appears well-developed and well-nourished  No distress     HENT:   Head: Normocephalic and atraumatic  Cardiovascular: Normal rate, regular rhythm, normal heart sounds and intact distal pulses  Exam reveals no gallop and no friction rub  No murmur heard  Pulmonary/Chest: Effort normal and breath sounds normal  No respiratory distress  He has no wheezes  Neurological: He is alert and oriented to person, place, and time  Skin: Skin is warm and dry  Laceration noted  He is not diaphoretic  Psychiatric: He has a normal mood and affect  His behavior is normal  Thought content normal    Vitals reviewed

## 2021-04-14 DIAGNOSIS — Z23 ENCOUNTER FOR IMMUNIZATION: ICD-10-CM

## 2021-04-30 ENCOUNTER — IMMUNIZATIONS (OUTPATIENT)
Dept: FAMILY MEDICINE CLINIC | Facility: HOSPITAL | Age: 31
End: 2021-04-30

## 2021-04-30 DIAGNOSIS — Z23 ENCOUNTER FOR IMMUNIZATION: Primary | ICD-10-CM

## 2021-04-30 PROCEDURE — 0001A SARS-COV-2 / COVID-19 MRNA VACCINE (PFIZER-BIONTECH) 30 MCG: CPT

## 2021-04-30 PROCEDURE — 91300 SARS-COV-2 / COVID-19 MRNA VACCINE (PFIZER-BIONTECH) 30 MCG: CPT

## 2021-05-22 ENCOUNTER — IMMUNIZATIONS (OUTPATIENT)
Dept: FAMILY MEDICINE CLINIC | Facility: HOSPITAL | Age: 31
End: 2021-05-22

## 2021-05-22 DIAGNOSIS — Z23 ENCOUNTER FOR IMMUNIZATION: Primary | ICD-10-CM

## 2021-05-22 PROCEDURE — 91300 SARS-COV-2 / COVID-19 MRNA VACCINE (PFIZER-BIONTECH) 30 MCG: CPT

## 2021-05-22 PROCEDURE — 0002A SARS-COV-2 / COVID-19 MRNA VACCINE (PFIZER-BIONTECH) 30 MCG: CPT

## 2022-03-31 ENCOUNTER — OFFICE VISIT (OUTPATIENT)
Dept: FAMILY MEDICINE CLINIC | Facility: CLINIC | Age: 32
End: 2022-03-31
Payer: COMMERCIAL

## 2022-03-31 VITALS
HEART RATE: 87 BPM | SYSTOLIC BLOOD PRESSURE: 108 MMHG | RESPIRATION RATE: 16 BRPM | BODY MASS INDEX: 26.11 KG/M2 | WEIGHT: 197 LBS | OXYGEN SATURATION: 97 % | DIASTOLIC BLOOD PRESSURE: 68 MMHG | HEIGHT: 73 IN

## 2022-03-31 DIAGNOSIS — R53.83 FATIGUE, UNSPECIFIED TYPE: ICD-10-CM

## 2022-03-31 DIAGNOSIS — R06.83 SNORING: ICD-10-CM

## 2022-03-31 DIAGNOSIS — R42 DIZZINESS: Primary | ICD-10-CM

## 2022-03-31 DIAGNOSIS — G25.0 BENIGN ESSENTIAL TREMOR: ICD-10-CM

## 2022-03-31 DIAGNOSIS — Z13.6 SCREENING FOR CARDIOVASCULAR CONDITION: ICD-10-CM

## 2022-03-31 DIAGNOSIS — Z11.4 SCREENING FOR HIV (HUMAN IMMUNODEFICIENCY VIRUS): ICD-10-CM

## 2022-03-31 PROBLEM — S83.232A COMPLEX TEAR OF MEDIAL MENISCUS OF LEFT KNEE AS CURRENT INJURY: Status: RESOLVED | Noted: 2019-03-13 | Resolved: 2022-03-31

## 2022-03-31 PROBLEM — M25.562 CHRONIC PAIN OF LEFT KNEE: Status: RESOLVED | Noted: 2019-02-14 | Resolved: 2022-03-31

## 2022-03-31 PROBLEM — G89.29 CHRONIC PAIN OF LEFT KNEE: Status: RESOLVED | Noted: 2019-02-14 | Resolved: 2022-03-31

## 2022-03-31 PROBLEM — R22.1 LUMP ON NECK: Status: RESOLVED | Noted: 2019-02-14 | Resolved: 2022-03-31

## 2022-03-31 PROCEDURE — 93000 ELECTROCARDIOGRAM COMPLETE: CPT | Performed by: FAMILY MEDICINE

## 2022-03-31 PROCEDURE — 99204 OFFICE O/P NEW MOD 45 MIN: CPT | Performed by: FAMILY MEDICINE

## 2022-03-31 NOTE — PROGRESS NOTES
Assessment/Plan:    Problem List Items Addressed This Visit        Nervous and Auditory    Benign essential tremor     Coarse tremors of both upper extremities since teenager ,advised to see neurologist         Relevant Orders    CBC and differential    TSH, 3rd generation with Free T4 reflex    Ambulatory Referral to Neurology    Vitamin B12       Other    Snoring    Relevant Orders    Ambulatory Referral to Sleep Medicine    Dizziness - Primary     ekg normal, most likely postural hypotension , advised to drink more fluids daily          Relevant Orders    CBC and differential    Comprehensive metabolic panel    TSH, 3rd generation with Free T4 reflex    Lipid panel    POCT ECG (Completed)    Ambulatory Referral to Sleep Medicine    Ambulatory Referral to Neurology    Vitamin B12    Fatigue    Relevant Orders    Ambulatory Referral to Sleep Medicine    Lyme Total Antibody Profile with reflex to WB    Ambulatory Referral to Neurology    Screening for HIV (human immunodeficiency virus)    Relevant Orders    HIV 1/2 Antigen/Antibody (4th Generation) w Reflex [facility]      EKG normal    Return in about 2 weeks (around 4/14/2022)  Chief Complaint   Patient presents with    Dizziness       Subjective:   Patient ID: Justice Stein is a 34 y o  male  new pt , very long hx of intermittent feeling black out /dizzy , mostly with position change , he says sitting immediately help to improve , no pass out,  Also c/l fatigue all the time ,  Hand and arm tremors mostly with activity,this is also since he was teen ager   No lyme ds,         HPI    Review of Systems   Constitutional: Positive for fatigue  Negative for activity change, appetite change, chills, fever and unexpected weight change  HENT: Negative for congestion, ear discharge, ear pain, nosebleeds, postnasal drip, rhinorrhea, sinus pressure, sneezing, sore throat, trouble swallowing and voice change      Eyes: Negative for photophobia, pain, discharge, redness and itching  Respiratory: Negative for cough, chest tightness, shortness of breath and wheezing  Cardiovascular: Negative for chest pain, palpitations and leg swelling  Gastrointestinal: Negative for abdominal pain, constipation, diarrhea, nausea and vomiting  Endocrine: Negative for polyuria  Genitourinary: Negative for dysuria, frequency and urgency  Musculoskeletal: Negative for arthralgias, back pain, myalgias and neck pain  Skin: Negative for color change, pallor and rash  Allergic/Immunologic: Negative for environmental allergies and food allergies  Neurological: Positive for dizziness and tremors  Negative for seizures, syncope, facial asymmetry, speech difficulty, weakness, light-headedness, numbness and headaches  Hematological: Negative for adenopathy  Does not bruise/bleed easily  Psychiatric/Behavioral: Negative for behavioral problems  The patient is not nervous/anxious  Objective:  Physical Exam  Vitals and nursing note reviewed  Constitutional:       Appearance: He is well-developed  He is not ill-appearing  HENT:      Head: Normocephalic and atraumatic  Right Ear: External ear normal       Left Ear: External ear normal       Nose: Nose normal  No rhinorrhea  Mouth/Throat:      Pharynx: No oropharyngeal exudate or posterior oropharyngeal erythema  Eyes:      General: No scleral icterus  Right eye: No discharge  Left eye: No discharge  Conjunctiva/sclera: Conjunctivae normal       Pupils: Pupils are equal, round, and reactive to light  Neck:      Thyroid: No thyromegaly  Trachea: No tracheal deviation  Cardiovascular:      Rate and Rhythm: Normal rate and regular rhythm  Heart sounds: Normal heart sounds  No murmur heard  Pulmonary:      Effort: Pulmonary effort is normal  No respiratory distress  Breath sounds: Normal breath sounds  No wheezing or rales     Abdominal:      General: Bowel sounds are normal  There is no distension  Palpations: Abdomen is soft  There is no mass  Tenderness: There is no abdominal tenderness  There is no rebound  Musculoskeletal:         General: No deformity  Normal range of motion  Cervical back: Normal range of motion and neck supple  Right lower leg: No edema  Left lower leg: No edema  Lymphadenopathy:      Cervical: No cervical adenopathy  Skin:     General: Skin is warm  Coloration: Skin is not pale  Findings: No erythema or rash  Neurological:      Mental Status: He is alert and oriented to person, place, and time  Cranial Nerves: No cranial nerve deficit  Sensory: No sensory deficit  Motor: No weakness  Coordination: Coordination normal       Gait: Gait normal       Deep Tendon Reflexes: Reflexes are normal and symmetric  Reflexes normal       Comments: coarse tremors of both hands and arms    Psychiatric:         Behavior: Behavior normal          Thought Content: Thought content normal          Judgment: Judgment normal             Past Surgical History:   Procedure Laterality Date    FACIAL/NECK BIOPSY N/A 9/19/2019    Procedure: NECK LESION/MASS EXCISION BIOPSY;  Surgeon: Carolann Workman DO;  [facility];  Service: General    MASS EXCISION Left 9/19/2019    Procedure: FOREARM LESION EXCISION BIOPSY;  Surgeon: Carolann Workman DO;  [facility];  Service: General    CT KNEE SCOPE,MED/LAT MENISECTOMY Left 3/28/2019    Procedure: LEFT KNEE ARTHROSCOPIC PARTIAL MEDIAL MENISCECTOMY;  Surgeon: Lyndsey Berg MD;  [facility];  Service: Orthopedics    WISDOM TOOTH EXTRACTION         Family History   Problem Relation Age of Onset    No Known Problems Mother     Hypertension Father    [facility] Lupus Father     No Known Problems Sister     No Known Problems Sister     Leukemia Maternal Grandfather     Leukemia Cousin        No current outpatient medications on file      No Known Allergies    Vitals: 03/31/22 0845 03/31/22 0848   BP: 110/60 108/68   Pulse: 87    Resp: 16    SpO2: 97%    Weight: 89 4 kg (197 lb)    Height: 6' 1" (1 854 m)

## 2022-04-13 ENCOUNTER — APPOINTMENT (OUTPATIENT)
Dept: LAB | Facility: CLINIC | Age: 32
End: 2022-04-13
Payer: COMMERCIAL

## 2022-04-13 DIAGNOSIS — R42 DIZZINESS: ICD-10-CM

## 2022-04-13 DIAGNOSIS — G25.0 BENIGN ESSENTIAL TREMOR: ICD-10-CM

## 2022-04-13 DIAGNOSIS — Z11.4 SCREENING FOR HIV (HUMAN IMMUNODEFICIENCY VIRUS): ICD-10-CM

## 2022-04-13 DIAGNOSIS — Z13.6 SCREENING FOR CARDIOVASCULAR CONDITION: ICD-10-CM

## 2022-04-13 DIAGNOSIS — R53.83 FATIGUE, UNSPECIFIED TYPE: ICD-10-CM

## 2022-04-13 LAB
ALBUMIN SERPL BCP-MCNC: 4 G/DL (ref 3.5–5)
ALP SERPL-CCNC: 80 U/L (ref 46–116)
ALT SERPL W P-5'-P-CCNC: 37 U/L (ref 12–78)
ANION GAP SERPL CALCULATED.3IONS-SCNC: 4 MMOL/L (ref 4–13)
AST SERPL W P-5'-P-CCNC: 21 U/L (ref 5–45)
B BURGDOR IGG+IGM SER-ACNC: 1
BASOPHILS # BLD AUTO: 0.06 THOUSANDS/ΜL (ref 0–0.1)
BASOPHILS NFR BLD AUTO: 1 % (ref 0–1)
BILIRUB SERPL-MCNC: 0.25 MG/DL (ref 0.2–1)
BUN SERPL-MCNC: 14 MG/DL (ref 5–25)
CALCIUM SERPL-MCNC: 9.6 MG/DL (ref 8.3–10.1)
CHLORIDE SERPL-SCNC: 106 MMOL/L (ref 100–108)
CHOLEST SERPL-MCNC: 194 MG/DL
CO2 SERPL-SCNC: 26 MMOL/L (ref 21–32)
CREAT SERPL-MCNC: 1.1 MG/DL (ref 0.6–1.3)
EOSINOPHIL # BLD AUTO: 0.29 THOUSAND/ΜL (ref 0–0.61)
EOSINOPHIL NFR BLD AUTO: 3 % (ref 0–6)
ERYTHROCYTE [DISTWIDTH] IN BLOOD BY AUTOMATED COUNT: 12.7 % (ref 11.6–15.1)
GFR SERPL CREATININE-BSD FRML MDRD: 87 ML/MIN/1.73SQ M
GLUCOSE P FAST SERPL-MCNC: 86 MG/DL (ref 65–99)
HCT VFR BLD AUTO: 48.2 % (ref 36.5–49.3)
HDLC SERPL-MCNC: 30 MG/DL
HGB BLD-MCNC: 15.8 G/DL (ref 12–17)
IMM GRANULOCYTES # BLD AUTO: 0.03 THOUSAND/UL (ref 0–0.2)
IMM GRANULOCYTES NFR BLD AUTO: 0 % (ref 0–2)
LDLC SERPL CALC-MCNC: 122 MG/DL (ref 0–100)
LYMPHOCYTES # BLD AUTO: 3.89 THOUSANDS/ΜL (ref 0.6–4.47)
LYMPHOCYTES NFR BLD AUTO: 40 % (ref 14–44)
MCH RBC QN AUTO: 29.5 PG (ref 26.8–34.3)
MCHC RBC AUTO-ENTMCNC: 32.8 G/DL (ref 31.4–37.4)
MCV RBC AUTO: 90 FL (ref 82–98)
MONOCYTES # BLD AUTO: 0.81 THOUSAND/ΜL (ref 0.17–1.22)
MONOCYTES NFR BLD AUTO: 8 % (ref 4–12)
NEUTROPHILS # BLD AUTO: 4.63 THOUSANDS/ΜL (ref 1.85–7.62)
NEUTS SEG NFR BLD AUTO: 48 % (ref 43–75)
NONHDLC SERPL-MCNC: 164 MG/DL
NRBC BLD AUTO-RTO: 0 /100 WBCS
PLATELET # BLD AUTO: 282 THOUSANDS/UL (ref 149–390)
PMV BLD AUTO: 8.9 FL (ref 8.9–12.7)
POTASSIUM SERPL-SCNC: 4.2 MMOL/L (ref 3.5–5.3)
PROT SERPL-MCNC: 7.8 G/DL (ref 6.4–8.2)
RBC # BLD AUTO: 5.36 MILLION/UL (ref 3.88–5.62)
SODIUM SERPL-SCNC: 136 MMOL/L (ref 136–145)
TRIGL SERPL-MCNC: 212 MG/DL
TSH SERPL DL<=0.05 MIU/L-ACNC: 3.89 UIU/ML (ref 0.45–4.5)
VIT B12 SERPL-MCNC: 494 PG/ML (ref 100–900)
WBC # BLD AUTO: 9.71 THOUSAND/UL (ref 4.31–10.16)

## 2022-04-13 PROCEDURE — 80061 LIPID PANEL: CPT

## 2022-04-13 PROCEDURE — 87389 HIV-1 AG W/HIV-1&-2 AB AG IA: CPT

## 2022-04-13 PROCEDURE — 36415 COLL VENOUS BLD VENIPUNCTURE: CPT

## 2022-04-13 PROCEDURE — 84443 ASSAY THYROID STIM HORMONE: CPT

## 2022-04-13 PROCEDURE — 80053 COMPREHEN METABOLIC PANEL: CPT

## 2022-04-13 PROCEDURE — 85025 COMPLETE CBC W/AUTO DIFF WBC: CPT

## 2022-04-13 PROCEDURE — 82607 VITAMIN B-12: CPT

## 2022-04-13 PROCEDURE — 86618 LYME DISEASE ANTIBODY: CPT

## 2022-04-14 ENCOUNTER — OFFICE VISIT (OUTPATIENT)
Dept: FAMILY MEDICINE CLINIC | Facility: CLINIC | Age: 32
End: 2022-04-14
Payer: COMMERCIAL

## 2022-04-14 VITALS
HEIGHT: 73 IN | RESPIRATION RATE: 16 BRPM | BODY MASS INDEX: 26.11 KG/M2 | WEIGHT: 197 LBS | OXYGEN SATURATION: 96 % | DIASTOLIC BLOOD PRESSURE: 70 MMHG | HEART RATE: 91 BPM | SYSTOLIC BLOOD PRESSURE: 118 MMHG

## 2022-04-14 DIAGNOSIS — G25.0 BENIGN ESSENTIAL TREMOR: ICD-10-CM

## 2022-04-14 DIAGNOSIS — R42 DIZZINESS: ICD-10-CM

## 2022-04-14 DIAGNOSIS — R06.83 SNORING: ICD-10-CM

## 2022-04-14 DIAGNOSIS — E78.2 MIXED HYPERLIPIDEMIA: Primary | ICD-10-CM

## 2022-04-14 LAB — HIV 1+2 AB+HIV1 P24 AG SERPL QL IA: NORMAL

## 2022-04-14 PROCEDURE — 99213 OFFICE O/P EST LOW 20 MIN: CPT | Performed by: FAMILY MEDICINE

## 2022-04-14 NOTE — ASSESSMENT & PLAN NOTE
Discussed about his labs HDL is slightly low triglyceride elevated and LDL slightly elevated, he will work on low-fat diet and advised to have yearly physical and labs
Even increasing fluid intake did not improve his dizziness, he is drinking almost 60 oz of water daily and he has a long term problem her labs are not showing any significant abnormality
Has made appointment with Neurology, B12 level is normal, but lower level ,discussed with him , whether  he should take the B12 supplement, will discuss with his neurologist  Lab Results   Component Value Date    Eliza Brown 494 04/13/2022
He snores, he feels fatigue during daytime, he has made appointment with the sleep specialist for evaluation for any sleep apnea
No

## 2022-04-14 NOTE — PROGRESS NOTES
Assessment/Plan:    Problem List Items Addressed This Visit        Nervous and Auditory    Benign essential tremor     Has made appointment with Neurology, B12 level is normal, but lower level ,discussed with him , whether  he should take the B12 supplement, will discuss with his neurologist  Lab Results   Component Value Date    Gaston Lu 494 04/13/2022                 Other    Snoring     He snores, he feels fatigue during daytime, he has made appointment with the sleep specialist for evaluation for any sleep apnea         Dizziness     Even increasing fluid intake did not improve his dizziness, he is drinking almost 60 oz of water daily and he has a long term problem her labs are not showing any significant abnormality         Mixed hyperlipidemia - Primary     Discussed about his labs HDL is slightly low triglyceride elevated and LDL slightly elevated, he will work on low-fat diet and advised to have yearly physical and labs               No follow-ups on file  Chief Complaint   Patient presents with    Follow-up       Subjective:   Patient ID: Aly Whitt is a 34 y o  male  He is here for follow-up on his labs, he has long-term issue off arm tremors, fatigue, dizziness almost daily with the position change, he has increased his fluid intake as his blood pressure was on lower side last time and thought it was due to his low blood pressure he get positional hypertension, but even increasing fluid intake does not improve the dizziness  He has made appointment with neurologist and the sleep specialist    HPI    Review of Systems   Constitutional: Positive for fatigue  HENT: Negative  Eyes: Negative  Respiratory: Negative  Cardiovascular: Negative  Gastrointestinal: Negative  Musculoskeletal: Negative  Skin: Negative  Neurological: Positive for dizziness  Psychiatric/Behavioral: Positive for sleep disturbance  Objective:  Physical Exam  Vitals and nursing note reviewed  Constitutional:       Appearance: Normal appearance  He is well-developed  He is not ill-appearing  HENT:      Head: Normocephalic and atraumatic  Eyes:      General: No scleral icterus  Right eye: No discharge  Left eye: No discharge  Extraocular Movements: Extraocular movements intact  Neck:      Thyroid: No thyromegaly  Trachea: No tracheal deviation  Cardiovascular:      Rate and Rhythm: Normal rate and regular rhythm  Heart sounds: Normal heart sounds  No murmur heard  Pulmonary:      Effort: Pulmonary effort is normal  No respiratory distress  Breath sounds: Normal breath sounds  No wheezing or rales  Musculoskeletal:         General: Normal range of motion  Cervical back: Normal range of motion and neck supple  Right lower leg: No edema  Left lower leg: No edema  Lymphadenopathy:      Cervical: No cervical adenopathy  Skin:     General: Skin is warm  Coloration: Skin is not pale  Findings: No erythema or rash  Neurological:      Mental Status: He is alert and oriented to person, place, and time  Deep Tendon Reflexes: Reflexes are normal and symmetric  Psychiatric:         Behavior: Behavior normal          Thought Content:  Thought content normal          Judgment: Judgment normal             Past Surgical History:   Procedure Laterality Date    FACIAL/NECK BIOPSY N/A 9/19/2019    Procedure: NECK LESION/MASS EXCISION BIOPSY;  Surgeon: Pola Perez DO;  [facility];  Service: General    MASS EXCISION Left 9/19/2019    Procedure: FOREARM LESION EXCISION BIOPSY;  Surgeon: Pola Perez DO;  [facility];  Service: General    AR KNEE SCOPE,MED/LAT MENISECTOMY Left 3/28/2019    Procedure: LEFT KNEE ARTHROSCOPIC PARTIAL MEDIAL MENISCECTOMY;  Surgeon: Isa Paulino MD;  [facility];  Service: Orthopedics    WISDOM TOOTH EXTRACTION         Family History   Problem Relation Age of Onset    No Known Problems Mother     Hypertension Father    [facility] Lupus Father     No Known Problems Sister     No Known Problems Sister     Leukemia Maternal Grandfather     Leukemia Cousin        No current outpatient medications on file      No Known Allergies    Vitals:    04/14/22 0839   BP: 118/70   Pulse: 91   Resp: 16   SpO2: 96%   Weight: 89 4 kg (197 lb)   Height: 6' 1" (1 854 m)

## 2022-04-25 ENCOUNTER — CONSULT (OUTPATIENT)
Dept: NEUROLOGY | Facility: CLINIC | Age: 32
End: 2022-04-25
Payer: COMMERCIAL

## 2022-04-25 VITALS
WEIGHT: 205 LBS | DIASTOLIC BLOOD PRESSURE: 80 MMHG | BODY MASS INDEX: 27.17 KG/M2 | TEMPERATURE: 91 F | SYSTOLIC BLOOD PRESSURE: 100 MMHG | HEIGHT: 73 IN | HEART RATE: 80 BPM

## 2022-04-25 DIAGNOSIS — R53.83 FATIGUE, UNSPECIFIED TYPE: ICD-10-CM

## 2022-04-25 DIAGNOSIS — H93.8X2 EAR FULLNESS, LEFT: Primary | ICD-10-CM

## 2022-04-25 DIAGNOSIS — G25.0 BENIGN ESSENTIAL TREMOR: ICD-10-CM

## 2022-04-25 DIAGNOSIS — R42 DIZZINESS: ICD-10-CM

## 2022-04-25 PROCEDURE — 99205 OFFICE O/P NEW HI 60 MIN: CPT | Performed by: STUDENT IN AN ORGANIZED HEALTH CARE EDUCATION/TRAINING PROGRAM

## 2022-04-25 PROCEDURE — 3008F BODY MASS INDEX DOCD: CPT | Performed by: STUDENT IN AN ORGANIZED HEALTH CARE EDUCATION/TRAINING PROGRAM

## 2022-04-25 RX ORDER — PRIMIDONE 50 MG/1
25 TABLET ORAL
Qty: 30 TABLET | Refills: 2 | Status: SHIPPED | OUTPATIENT
Start: 2022-04-25 | End: 2022-10-22

## 2022-04-25 NOTE — PATIENT INSTRUCTIONS
Essential Tremor   AMBULATORY CARE:   An essential tremor  is uncontrolled muscle shaking that has no known cause  Your healthcare provider can diagnose essential tremor based on your signs and symptoms  Other tests may be used to make sure another condition is not causing the tremors  Essential tremors can happen at any age but are most common after 36years of age  Common signs and symptoms of essential tremor:  Signs and symptoms may be mild or severe  You may have tremors when you try to hold still or when you move  Any of the following may get worse slowly, over time:  · Hands or arms shake, especially when you hold or reach for objects    · Voice quivers when you speak    · Legs shake when you stand still    · Trouble controlling your hands or arms, holding objects, or writing    · Trouble doing daily activities such as brushing your teeth or shaving    · Head nodding or shaking you cannot control    · Shaking brought on or made worse by stress, fatigue, or chemicals such as caffeine    · A feeling of shaking or trembling inside, even if your body does not shake    Contact your healthcare provider if:   · You have new or worsening symptoms  · You have questions or concerns about your condition or care  Treatment  may not be needed  If your tremors are severe or keep you from doing daily activities, the following may help:  · Medicines  may be given to control tremors so you can do your normal activities more easily  Medicines will not completely control or stop the tremors, but they may help  · Deep brain stimulation  is a procedure used if other treatments do not control your tremors  Electric stimulation is given to parts of the brain that control movement  The stimulation stops those parts of the brain from working  · Surgery  may be used if your tremors are severe and medicines have not helped  Surgery may be used to destroy part of your thalamus   The thalamus is a part of the brain that control movement and coordination  Manage your symptoms:   · Go to occupational therapy as directed  An occupational therapist can help you find new ways to do your daily activities  For example, you may learn to use wrist weights during activities such as brushing your teeth  The weights can help steady your arms and hands  · Use assistive devices as directed  Weighted utensils can help you control your movements as you eat  Adaptive equipment, such as a iPointer mouse, for the computer can help make computers easier to use  It may also help to install voice recognition software on your computer  The software allows you to talk instead of using the keyboard to type  Electric appliances such as can openers and toothbrushes can make daily living activities easier  Ask your healthcare provider or occupational therapist for more information on assistive devices  · Wear clothing that is easy to put on and take off  Examples include shoes that do not need laces and shirts without buttons  You can also get a device to help you get buttons through the button holes  · Do not have caffeine  Caffeine can make tremors worse  · Do not smoke  Nicotine and other chemicals in cigarettes and cigars can make tremors worse  Ask your healthcare provider for information if you currently smoke and need help to quit  E-cigarettes or smokeless tobacco still contain nicotine  Talk to your healthcare provider before you use these products  · Manage stress  Stress can trigger tremors or make them worse  Find ways to manage stress, such as deep breathing, listening to music, or getting a massage  Talk to your healthcare provider if you need help to control anxiety  · Ask about medicines  Some medicines can make tremors worse  An example is cold medicines  Talk to your healthcare provider about all the medicines you take  · Keep a record of your tremors    Include when the tremors happened, and how long they lasted  List anything you think might have triggered the tremors or made them stop  It might be helpful to include any foods or drinks you had that contained caffeine or were unusual for you  Bring the record with you to your follow-up appointments  Follow up with your healthcare provider as directed: You may be referred to a neurologist (nerve specialist)  Write down your questions so you remember to ask them during your visits  © Copyright [employer] 2022 Information is for End User's use only and may not be sold, redistributed or otherwise used for commercial purposes  All illustrations and images included in CareNotes® are the copyrighted property of A D A M , Inc  or "[employer]   The above information is an  only  It is not intended as medical advice for individual conditions or treatments  Talk to your doctor, nurse or pharmacist before following any medical regimen to see if it is safe and effective for you  Primidone (By mouth)   Primidone (PRIM-i-done)  Treats seizure disorders  Brand Name(s): Mysoline   There may be other brand names for this medicine  When This Medicine Should Not Be Used: This medicine is not right for everyone  Do not use it if you had an allergic reaction to primidone or phenobarbital or if you have porphyria  How to Use This Medicine:   Liquid, Tablet  · Take your medicine as directed  Your dose may need to be changed several times to find what works best for you     · Shake the oral liquid well just before each dose  Measure the oral liquid medicine with a marked measuring spoon, oral syringe, or medicine cup  · Plan your doses so they are evenly spaced during the day  Talk with your doctor or pharmacist if you have questions about when to take this medicine  · This medicine should come with a Medication Guide  Ask your pharmacist for a copy if you do not have one  · Missed dose: Take a dose as soon as you remember   If it is almost time for your next dose, wait until then and take a regular dose  Do not take extra medicine to make up for a missed dose  · Store the medicine in a closed container at room temperature, away from heat, moisture, and direct light  Do not freeze  Drugs and Foods to Avoid:   Ask your doctor or pharmacist before using any other medicine, including over-the-counter medicines, vitamins, and herbal products  · Tell your doctor if you are also taking birth control pills  · Tell your doctor if you use anything else that makes you sleepy  Some examples are allergy medicine, narcotic pain medicine, and alcohol  Warnings While Using This Medicine:   · Tell your doctor if you are pregnant or if you have kidney disease or a history of depression  · Mothers who are breastfeeding: The medicine will be in your breast milk  Stop breastfeeding if your baby becomes unusually tired or sleepy  · This medicine can increase depression and thoughts of suicide  Tell your doctor right away if you start to feel more depressed or have thoughts about hurting yourself  · This medicine may make you dizzy or drowsy  Do not drive or do anything else that could be dangerous until you know how this medicine affects you  · Your doctor will do lab tests at regular visits to check on the effects of this medicine  Keep all appointments  · Do not stop using this medicine suddenly  Your doctor will need to slowly decrease your dose before you stop it completely  · Keep all medicine out of the reach of children  Never share your medicine with anyone    Possible Side Effects While Using This Medicine:   Call your doctor right away if you notice any of these side effects:  · Allergic reaction: Itching or hives, swelling in your face or hands, swelling or tingling in your mouth or throat, chest tightness, trouble breathing  · Severe dizziness, sleepiness, or trouble walking  · Thoughts of hurting yourself, depression, unusual changes in behavior  If you notice these less serious side effects, talk with your doctor:   · Dizziness  · Weakness, clumsiness  If you notice other side effects that you think are caused by this medicine, tell your doctor  Call your doctor for medical advice about side effects  You may report side effects to FDA at 6-432-FDA-4667    © Copyright [address] 2022 Information is for End User's use only and may not be sold, redistributed or otherwise used for commercial purposes  The above information is an  only  It is not intended as medical advice for individual conditions or treatments  Talk to your doctor, nurse or pharmacist before following any medical regimen to see if it is safe and effective for you

## 2022-04-25 NOTE — PROGRESS NOTES
[address] Neurology Consult  PATIENT:  Justice Stein  MRN:  [id number]  :  1990  DATE OF SERVICE:  2022  REFERRED BY: Jamie Mcknight MD  PMD: Gretchen Lee MD    Assessment/Plan:     Justice Stein is a very pleasant  34 y o  male with no significant PMHx who presents for evaluation of tremors and dizziness  On exam today, has action/positional tremor which is not present at rest  Exam findings, time course of symptoms, and alleviation with alcohol are consistent with a diagnosis of essential tremor  He has tried propranolol in the past which was ineffective, but with questionable compliance  His presyncopal episodes are likely vasovagal, but since they are now also occurring at times while he is at rest he may benefit from further evaluation for possible cardiac causes  #essential tremor  -primidone 25 mg at bedtime  Will uptitrate to symptom relief as tolerated up to 75 mg daily  - counseled patient that caffeine and tobacco use may worsen his tremor    #presyncope  - orthostatic BP obtained in office today - sitting 110/80->standing 100/80  - ECG performed 3/31/22 - normal sinus rhythm  - consider cards eval and w/u for potential cardiac causes of pre-syncope    #ear fullness and diminished hearing on the L  - ENT referral     Follow-up  - f/u in 3 months    CC:   Tremor and dizziness    History of Present Illness:      34 y o  male with no significant PMHx who presents for evaluation of tremors and dizziness  His tremor started at the end of college, around age 21-23  Notices it most often in his hands but it also sometimes occurs in his legs  These tremors worsen with action and movement  He used to drink more heavily during college, and at that time did notice an improvement in his tremor with alcohol intake   He states that he tried propranolol for these symptoms in the past  He does not think that it was particularly effective; however, he had poor adherence due to frequent drinking at that time  He states that he has had dizzy spells since he was very young, but started to have them more often around 6-7 weeks ago  States that these episodes happen most often when he is transitioning from a sitting to a standing position; however, around 6 weeks ago they also started to occur occasionally when he was seated  He describes feeling lightheaded, his vision goes gray, starts to have a metallic taste in his mouth with tinnitus  He has never lost consciousness with these episodes because he usually sits down and waits for the symptoms to subside before attempting to get up and walk again  Symptoms last ~20 s  He also notices episodes of L ear fullness and decreased hearing in his L ear which started around the same time that his episodes of dizziness increased in frequency  Denies any vertigo associated with these symptoms or otherwise  LIFESTYLE  Sleep   - averages: 7 hrs  Problems falling asleep?:   No  Problems staying asleep?:  No    Water: 60 oz jugs of water per day  Caffeine: 3 cups of coffee    Mood:   Endorses anxiety    The following portions of the patient's history were reviewed and updated as appropriate: allergies, current medications, past family history, past medical history, past social history, past surgical history and problem list     Pertinent social history:  Work: works in [employer], works in real estate transactions  Illicit Drugs: denies  Alcohol/tobacco: smokes cigars, 2-3 per week    Past Medical History:   No past medical history on file  Patient Active Problem List   Diagnosis    Benign essential tremor    Snoring    Overweight (BMI 25 0-29  9)    Tobacco use    Arm skin lesion, left    Dermoid cyst of neck    Dizziness    Fatigue    Screening for HIV (human immunodeficiency virus)    Mixed hyperlipidemia       Medications:      No current outpatient medications on file  No current facility-administered medications for this visit  Allergies:    No Known Allergies    Family History:     Family History   Problem Relation Age of Onset    No Known Problems Mother     Hypertension Father     Lupus Father     No Known Problems Sister     No Known Problems Sister     Leukemia Maternal Grandfather     Leukemia Cousin        Social History:       Social History     Socioeconomic History    Marital status: Single     Spouse name: Not on file    Number of children: Not on file    Years of education: Not on file    Highest education level: Not on file   Occupational History    Not on file   Tobacco Use    Smoking status: Current Every Day Smoker     Years: 2 00     Types: Cigars    Smokeless tobacco: Never Used    Tobacco comment: cigars <1 then one a day   Substance and Sexual Activity    Alcohol use: Yes     Comment: Socially    Drug use: Yes     Types: Marijuana     Comment: 3-4 times a week    Sexual activity: Yes     Partners: Female     Birth control/protection: None   Other Topics Concern    Not on file   Social History Narrative    Title work     Social Determinants of Health     Financial Resource Strain: Not on file   Food Insecurity: Not on file   Transportation Needs: Not on file   Physical Activity: Not on file   Stress: Not on file   Social Connections: Not on file   Intimate Partner Violence: Not on file   Housing Stability: Not on file         Objective:   /80 (BP Location: Left arm, Patient Position: Sitting, Cuff Size: Standard)   Pulse 80   Temp (!) 91 °F (32 8 °C)   Ht 6' 1" (1 854 m)   Wt 93 kg (205 lb)   BMI 27 05 kg/m²    Orthostatic BP: sitting 110/80->standing 100/80     General: Patient is not in any acute/apparent distress, well nourished, well developed and cooperative  HEENT: normocephalic, atraumatic, moist membranes  Neck: supple  Heart: regular heart rate and rhythm, no murmurs, rubs and or gallops  Chest: Clear to auscultation bilaterally  Abdomen: soft and non-tender     Extremities: no edema noted   Skin: no lesions or rash  Musculosketal: no bony abnormalities    Neurologic Examination:   Mental status: alert, awake, oriented X 3 and following commands  Speech/Language: Speech is fluent without any dysarthria, no aphasia noted, can name, repeat, read and write and comprehension intact    Cranial Nerves:   CN I: smell not tested  CN II: Visual fields full to confrontation, fundus - no papilledema noted  CN III, IV, VI: Extraocular movements intact bilaterally  Pupils equal round and reactive to light bilaterally  CN V: Facial sensation is normal   CN VII: Full and symmetric facial movement  CN VIII: Hearing is normal   CN IX, X: Palate elevates symmetrically  Normal gag reflex  CN XI: Shoulder shrug strength is normal   CN XII: Tongue midline without atrophy or fasciculations  Motor:   Strength 5/5 in all 4 extremities  No pronator drift  Normal rapid alternating movements  Bulk/tone - normal   Fasiculations - none  No tremors noted at rest  Postural tremor when holding up arms L>R  Sensory:   Sensation intact to soft touch, vibration and pinprick in all 4 extremities  Cerebellar:   Intention tremor on finger to nose  normal heel to shin  Reflexes: 3+ bilateral patellars, 2+ biceps/triceps/ankle  Pathologic reflexes - babinski reflex negative    Gait:   Normal gait, able to tandem walk, able to tip-toe, able to walk on heels       Review of Systems:     Review of Systems   Constitutional: Negative  Negative for appetite change and fever  HENT: Positive for tinnitus (Left ear)  Negative for hearing loss, trouble swallowing and voice change  Eyes: Negative  Negative for photophobia and pain  Respiratory: Negative  Negative for shortness of breath  Cardiovascular: Negative  Negative for palpitations  Gastrointestinal: Negative  Negative for nausea and vomiting  Endocrine: Negative  Negative for cold intolerance  Genitourinary: Negative    Negative for dysuria, frequency and urgency  Musculoskeletal: Negative  Negative for myalgias and neck pain  Skin: Negative  Negative for rash  Neurological: Positive for dizziness, tremors (In both and hands and sometimes in his legs), light-headedness and headaches (sometimes)  Negative for seizures, syncope, facial asymmetry, speech difficulty, weakness and numbness  Hematological: Negative  Does not bruise/bleed easily  Psychiatric/Behavioral: Negative  Negative for confusion, hallucinations and sleep disturbance  I have spent 35 minutes with Patient today in which greater than 50% of this time was spent in counseling/coordination of care regarding Risks and benefits of tx options, Intructions for management, Risk factor reductions and Impressions  I also spent 30 minutes non face to face for this patient the same day         Author:  Sonu Jerez MD 4/25/2022 8:12 AM

## 2022-08-01 ENCOUNTER — OFFICE VISIT (OUTPATIENT)
Dept: NEUROLOGY | Facility: CLINIC | Age: 32
End: 2022-08-01
Payer: COMMERCIAL

## 2022-08-01 VITALS
DIASTOLIC BLOOD PRESSURE: 88 MMHG | WEIGHT: 199.6 LBS | HEIGHT: 73 IN | SYSTOLIC BLOOD PRESSURE: 120 MMHG | HEART RATE: 72 BPM | BODY MASS INDEX: 26.45 KG/M2

## 2022-08-01 DIAGNOSIS — G25.0 TREMOR, ESSENTIAL: Primary | ICD-10-CM

## 2022-08-01 PROCEDURE — 99213 OFFICE O/P EST LOW 20 MIN: CPT | Performed by: STUDENT IN AN ORGANIZED HEALTH CARE EDUCATION/TRAINING PROGRAM

## 2022-08-01 RX ORDER — TOPIRAMATE 25 MG/1
25 TABLET ORAL DAILY
Qty: 90 TABLET | Refills: 3 | Status: SHIPPED | OUTPATIENT
Start: 2022-08-01

## 2022-08-01 NOTE — PATIENT INSTRUCTIONS
Patient Instructions: It was a pleasure seeing you again today! In a couple months time, I would like for you to trial primidone again taking it slightly earlier in the evening to see if you are less groggy in the morning  If you continue to have side effects, or the medication in ineffective, I am giving you a script for topamax to try  If you start topamax, you can start at 25 mg at night and can increase after 1 week up to 50 mg at night  F/u in 5 months

## 2022-08-01 NOTE — PROGRESS NOTES
[address] Neurology Consult  PATIENT:  Marie Lynn  MRN:  [id number]  :  1990  DATE OF SERVICE:  2022  REFERRED BY: No ref  provider found  PMD: Lucy Pierce MD    Assessment/Plan:     Marie Lynn is a very pleasant  34 y o  male with no significant PMHx who presents for f/u of tremors and presyncope    #essential tremor  - can trial primidone 25 mg earlier in the evening (8-9 pm)  He would like to delay this trial until the fall when he is less busy at work   - if ineffective or still intolerable side effects, can trial topamax 25 mg at night  Avoiding propranolol due to baseline complaints of presyncope   - counseled patient that caffeine and tobacco use may worsen his tremor    #presyncope  - ECG performed 3/31/22 - normal sinus rhythm  - consider cards eval and w/u for potential cardiac causes of pre-syncope  - advised goal hydration 2 L daily    #ear fullness and diminished hearing on the L  - ENT referral - previous referral placed but pt not contacted, will give phone number to schedule    Follow-up  - f/u in 3 months    CC:   Tremor and dizziness    History of Present Illness:   Interval Hx:  Tried primidone, made him too groggy in the morning, particularly when he increased up to 50 mg at night  He was taking the medication at around 11 pm  Since then has discontinued because he was too sleepy during work hours, which was increasing his stress  Previous Hx:   34 y o  male with no significant PMHx who presents for evaluation of tremors and dizziness  His tremor started at the end of college, around age 21-23  Notices it most often in his hands but it also sometimes occurs in his legs  These tremors worsen with action and movement  He used to drink more heavily during college, and at that time did notice an improvement in his tremor with alcohol intake   He states that he tried propranolol for these symptoms in the past  He does not think that it was particularly effective; however, he had poor adherence due to frequent drinking at that time  He states that he has had dizzy spells since he was very young, but started to have them more often around 6-7 weeks ago  States that these episodes happen most often when he is transitioning from a sitting to a standing position; however, around 6 weeks ago they also started to occur occasionally when he was seated  He describes feeling lightheaded, his vision goes gray, starts to have a metallic taste in his mouth with tinnitus  He has never lost consciousness with these episodes because he usually sits down and waits for the symptoms to subside before attempting to get up and walk again  Symptoms last ~20 s  He also notices episodes of L ear fullness and decreased hearing in his L ear which started around the same time that his episodes of dizziness increased in frequency  Denies any vertigo associated with these symptoms or otherwise  LIFESTYLE  Sleep   - averages: 7 hrs  Problems falling asleep?:   No  Problems staying asleep?:  No    Water: 60 oz jugs of water per day  Caffeine: 3 cups of coffee    Mood:   Endorses anxiety    The following portions of the patient's history were reviewed and updated as appropriate: allergies, current medications, past family history, past medical history, past social history, past surgical history and problem list     Pertinent social history:  Work: works in [employer], works in real estate transactions  Illicit Drugs: denies  Alcohol/tobacco: smokes cigars, 2-3 per week    Past Medical History:   History reviewed  No pertinent past medical history  Patient Active Problem List   Diagnosis    Benign essential tremor    Snoring    Overweight (BMI 25 0-29  9)    Tobacco use    Arm skin lesion, left    Dermoid cyst of neck    Dizziness    Fatigue    Screening for HIV (human immunodeficiency virus)    Mixed hyperlipidemia       Medications:      Current Outpatient Medications   Medication Sig Dispense Refill    primidone (MYSOLINE) 50 mg tablet Take 0 5 tablets (25 mg total) by mouth daily at bedtime After 1 week, can increase to 1 tablet at bedtime  After 2 weeks, can increase to 1 5 tablets at bedtime  (Patient not taking: Reported on 8/1/2022) 30 tablet 2     No current facility-administered medications for this visit          Allergies:    No Known Allergies    Family History:     Family History   Problem Relation Age of Onset    No Known Problems Mother     Hypertension Father     Lupus Father     No Known Problems Sister     No Known Problems Sister     Leukemia Maternal Grandfather     Leukemia Cousin        Social History:       Social History     Socioeconomic History    Marital status: Single     Spouse name: Not on file    Number of children: Not on file    Years of education: Not on file    Highest education level: Not on file   Occupational History    Not on file   Tobacco Use    Smoking status: Current Every Day Smoker     Years: 2 00     Types: Cigars    Smokeless tobacco: Never Used    Tobacco comment: cigars <1 then one a day   Substance and Sexual Activity    Alcohol use: Yes     Comment: Socially    Drug use: Yes     Types: Marijuana     Comment: 3-4 times a week    Sexual activity: Yes     Partners: Female     Birth control/protection: None   Other Topics Concern    Not on file   Social History Narrative    Title work     Social Determinants of Health     Financial Resource Strain: Not on file   Food Insecurity: Not on file   Transportation Needs: Not on file   Physical Activity: Not on file   Stress: Not on file   Social Connections: Not on file   Intimate Partner Violence: Not on file   Housing Stability: Not on file         Objective:   /88 (BP Location: Left arm, Patient Position: Sitting, Cuff Size: Standard)   Pulse 72   Ht 6' 1" (1 854 m)   Wt 90 5 kg (199 lb 9 6 oz)   BMI 26 33 kg/m²      General: Patient is not in any acute/apparent distress, well nourished, well developed and cooperative  HEENT: normocephalic, atraumatic, moist membranes  Neck: supple  Extremities: no edema noted   Skin: no lesions or rash  Musculosketal: no bony abnormalities    Neurologic Examination:   Mental status: alert, awake, oriented X 3 and following commands  Speech/Language: Speech is fluent without any dysarthria, no aphasia noted, can name, repeat, read and write and comprehension intact    Cranial Nerves:   CN I: smell not tested  CN II: Visual fields full to confrontation, fundus - no papilledema noted  CN III, IV, VI: Extraocular movements intact bilaterally  Pupils equal round and reactive to light bilaterally  CN V: Facial sensation is normal   CN VII: Full and symmetric facial movement  CN VIII: Hearing is normal  CN IX, X: Palate elevates symmetrically  CN XI: Shoulder shrug strength is normal   CN XII: Tongue midline without atrophy or fasciculations  Motor:   Strength 5/5 in all 4 extremities  No pronator drift  Normal rapid alternating movements  Bulk/tone - normal   Fasiculations - none  No tremors noted at rest  Postural tremor when holding up arms L>R  Fine action tremor R>L    Sensory:   Sensation intact to soft touch, vibration and pinprick in all 4 extremities  Cerebellar:   Intention tremor on finger to nose  normal heel to shin  Reflexes: 3+ bilateral patellars, 2+ biceps/triceps/ankle  Pathologic reflexes - babinski reflex negative    Gait:   Normal casual gait       Review of Systems:     Review of Systems   Constitutional: Negative  Negative for appetite change and fever  HENT: Positive for tinnitus (Left ear)  Negative for hearing loss, trouble swallowing and voice change  Eyes: Negative  Negative for photophobia and pain  Respiratory: Negative  Negative for shortness of breath  Cardiovascular: Negative  Negative for palpitations  Gastrointestinal: Negative  Negative for nausea and vomiting  Endocrine: Negative  Negative for cold intolerance  Genitourinary: Negative  Negative for dysuria, frequency and urgency  Musculoskeletal: Negative  Negative for myalgias and neck pain  Skin: Negative  Negative for rash  Neurological: Positive for dizziness, tremors (In both and hands and sometimes in his legs), light-headedness and headaches (sometimes)  Negative for seizures, syncope, facial asymmetry, speech difficulty, weakness and numbness  Hematological: Negative  Does not bruise/bleed easily  Psychiatric/Behavioral: Negative  Negative for confusion, hallucinations and sleep disturbance  I have spent 20 minutes with Patient today in which greater than 50% of this time was spent in counseling/coordination of care regarding Risks and benefits of tx options, Intructions for management, Risk factor reductions and Impressions  I also spent 10 minutes non face to face for this patient the same day

## 2022-09-12 ENCOUNTER — OFFICE VISIT (OUTPATIENT)
Dept: PULMONOLOGY | Facility: CLINIC | Age: 32
End: 2022-09-12
Payer: COMMERCIAL

## 2022-09-12 VITALS
TEMPERATURE: 97.8 F | DIASTOLIC BLOOD PRESSURE: 68 MMHG | WEIGHT: 195 LBS | HEIGHT: 73 IN | HEART RATE: 63 BPM | SYSTOLIC BLOOD PRESSURE: 100 MMHG | RESPIRATION RATE: 16 BRPM | OXYGEN SATURATION: 96 % | BODY MASS INDEX: 25.84 KG/M2

## 2022-09-12 DIAGNOSIS — G47.50 PARASOMNIA, UNSPECIFIED TYPE: ICD-10-CM

## 2022-09-12 DIAGNOSIS — R29.818 SUSPECTED SLEEP APNEA: Primary | ICD-10-CM

## 2022-09-12 PROCEDURE — 99244 OFF/OP CNSLTJ NEW/EST MOD 40: CPT | Performed by: INTERNAL MEDICINE

## 2022-09-12 NOTE — LETTER
September 12, 2022     Gina Caputo MD  [address]    Patient: Israel Farmer   YOB: 1990   Date of Visit: 9/12/2022       Dear Dr Souleymane Gregory:    Thank you for referring Mary Pedraza to me for evaluation  Below are my notes for this consultation  If you have questions, please do not hesitate to call me  I look forward to following your patient along with you  Sincerely,        Danna Thomas MD        CC: No Recipients  Danna Thomas MD  9/12/2022  2:14 PM  Attested  Sleep Consultation   Israel Farmer 34 y o  male MRN: [id number]      Reason for consultation: Snoring, fatigue    Requesting physician: Gina Caputo MD    Assessment/Plan     34 y o  male past medical history benign essential tremor presents for evaluation of snoring  1  Snoring/witnessed apneas   [place] score of 5   BMI 25 7, Mallampati 4Neck Circumference: 15 5  He is at risk for obstructive sleep apnea based on STOP BANG survey, as he has snoring, has excessive tiredness, male gender,  I discussed in depth the diagnostic studies and treatment options involved with obstructive sleep apnea  I also discussed in depth the risk of leaving sleep apnea untreated including hypertension, heart failure, arrhythmia, MI and stroke  The patient is agreeable to undergo testing and treatment of obstructive sleep apnea  He understands the pitfalls he may encounter along the way and is willing to attempt CPAP treatment  Plan   Ordered extended diagnostic polysomnogram  Follow-up after results     2   Parasomnia  Patient states there were regular episodes of sleep paralysis when he was younger, last episode of sleep paralysis 2-3 years ago   Denies cataplexy   Relates associated episodes of hallucinations where he sees movement and hears things that on their  Denies episodes of inappropriate sleep  Patient does have daytime sleepiness and takes a nap twice a week    Plan  Ordered extended diagnostic polysomnogram as patient may have coexistent parasomnia possible narcolepsy  Patient may need MSLT if any concerning findings  Follow-up after results          History of Present Illness   HPI:  Arlen Mendoza is a 34 y o  male past medical history benign essential tremor presents for evaluation of snoring  Patient has snored his whole life  His wife says that he snores loudly  Snoring is worse when he is sleeping on his back  His wife has seen episodes where he stops breathing and gasping for air  He has some daytime sleepiness and takes naps twice a week  [place] score of 5  He also complains of daytime fatigue that is been going on for the past 10 years  He is being worked up for the fatigue by his primary care physician  He recently had Lyme and HIV test which were negative  He also has blood pressure on the lower side and has frequent episodes of lightheadedness upon standing  He states they have been occurring since childhood  He wakes up with dry mouth  He states he has unrefreshed sleep  Patient reports episodes of sleep paralysis in the past   He states the last 1 was a couple years ago  They used to occur regularly as a teenager but not as much recently  He states that during these episodes he would see movement or hear things that were not there  Sleep history: Patient goes to bed at 10:00 p m  and falls asleep by 11:00 p m  He wakes up at 7:00 a m   No nighttime awakenings  He takes 2 naps a week, usually in the afternoon  He takes no medications to help him sleep  He drinks 20 oz cough coffee daily, rarely drinks alcohol, smokes 3 cigars a week      He uses marijuana vape 1-2 puffs after work daily      Review of Systems      Genitourinary none   Cardiology none   Gastrointestinal none   Neurology frequent headaches   Constitutional none   Integumentary none   Psychiatry anxiety   Musculoskeletal none   Pulmonary snoring   ENT ringing in ears   Endocrine none   Hematological none               Historical Information   No past medical history on file    Past Surgical History:   Procedure Laterality Date    FACIAL/NECK BIOPSY N/A 9/19/2019    Procedure: NECK LESION/MASS EXCISION BIOPSY;  Surgeon: Ramón Mcfarland DO;  [facility];  Service: General    MASS EXCISION Left 9/19/2019    Procedure: FOREARM LESION EXCISION BIOPSY;  Surgeon: Ramón Mcfarland DO;  [facility];  Service: General    [place] KNEE SCOPE,MED/LAT MENISECTOMY Left 3/28/2019    Procedure: LEFT KNEE ARTHROSCOPIC PARTIAL MEDIAL MENISCECTOMY;  Surgeon: Chelsea Buchanan MD;  [facility];  Service: Orthopedics    WISDOM TOOTH EXTRACTION       Family History   Problem Relation Age of Onset    No Known Problems Mother     Hypertension Father    Kat Riis Lupus Father     No Known Problems Sister     No Known Problems Sister     Leukemia Maternal Grandfather     Leukemia Cousin      Social History     Socioeconomic History    Marital status: Single     Spouse name: Not on file    Number of children: Not on file    Years of education: Not on file    Highest education level: Not on file   Occupational History    Not on file   Tobacco Use    Smoking status: Current Every Day Smoker     Years: 2 00     Types: Cigars    Smokeless tobacco: Never Used    Tobacco comment: cigars <1 then one a day   Vaping Use    Vaping Use: Never used   Substance and Sexual Activity    Alcohol use: Yes     Comment: Socially    Drug use: Yes     Types: Marijuana     Comment: 3-4 times a week    Sexual activity: Yes     Partners: Female     Birth control/protection: None   Other Topics Concern    Not on file   Social History Narrative    Title work     Social Determinants of Health     Financial Resource Strain: Not on file   Food Insecurity: Not on file   Transportation Needs: Not on file   Physical Activity: Not on file   Stress: Not on file   Social Connections: Not on file   Intimate Partner Violence: Not on file Housing Stability: Not on file       Occupational History:     Meds/Allergies   No Known Allergies    Home medications:  Prior to Admission medications    Medication Sig Start Date End Date Taking? Authorizing Provider   primidone (MYSOLINE) 50 mg tablet Take 0 5 tablets (25 mg total) by mouth daily at bedtime After 1 week, can increase to 1 tablet at bedtime  After 2 weeks, can increase to 1 5 tablets at bedtime  Patient not taking: No sig reported 4/25/22 10/22/22  Ria Turner MD   topiramate (Topamax) 25 mg tablet Take 1 tablet (25 mg total) by mouth daily  Patient not taking: Reported on 9/12/2022 8/1/22   Ria Turner MD       Vitals:   Blood pressure 100/68, pulse 63, temperature 97 8 °F (36 6 °C), resp  rate 16, height 6' 1" (1 854 m), weight 88 5 kg (195 lb), SpO2 96 %  Body mass index is 25 73 kg/m²  Neck Circumference: 15 5    Physical Exam  General: Awake alert and oriented x 3, conversant without conversational dyspnea, NAD, normal affect  HEENT:  PERRL, Sclera noninjected, nonicteric OU, Nares patent,  no craniofacial abnormalities, Mucous membranes, moist, no oral lesions, normal dentition, Mallampati class 4, Enlarged uvula  NECK:  Trachea midline, no accessory muscle use, no stridor, no cervical or supraclavicular adenopathy, JVP not elevated  CARDIAC: Reg, single s1/S2, no m/r/g  PULM: CTA bilaterally no wheezing, rhonchi or rales  EXT: No cyanosis, no clubbing, no edema, normal capillary refill  NEURO: no focal neurologic deficits, AAOx3, moving all extremities appropriately    Labs: I have personally reviewed pertinent lab results    Lab Results   Component Value Date    WBC 9 71 04/13/2022    HGB 15 8 04/13/2022    HCT 48 2 04/13/2022    MCV 90 04/13/2022     04/13/2022      Lab Results   Component Value Date    CALCIUM 9 6 04/13/2022    K 4 2 04/13/2022    CO2 26 04/13/2022     04/13/2022    BUN 14 04/13/2022    CREATININE 1 10 04/13/2022     No results found for: IRON, TIBC, FERRITIN  Lab Results   Component Value Date    [id number] 222 04/13/2022     No results found for: FOLATE      Arterial Blood Gas result: NA    Sleep studies:  Diagnostic:NA  Titration:  Split:    Compliance Data:  NA                                       MD Kenzie King's Sleep Fellow  Attestation signed by Jenene Cabot, DO at 9/12/2022  2:14 PM:  I have personally seen and examined  I discussed the patient with the  fellow including, but not limited to, verifying findings; reviewing labs and x-rays;developing the plan of care with patient  I have reviewed the note and assessment performed by the fellow and agree with the fellow's documented findings and plan of care with the following additions  Please see my following comments for details and adjustments  Assessment/Plan  34 y o  M with PMHx of benign essential tremor, tobacco use who comes in for evaluation of snoring and excessive daytime sleepiness  1   Snoring/Witnessed apneas and some daytime sleepiness -  Mallampati class 4, Body mass index is 25 73 kg/m² , Neck Circumference: 15 5  He is at risk for obstructive sleep apnea based on STOP BANG survey  -  Check an extended montage baseline polysomnogram to assess for obstructive sleep apnea      -  I discussed in depth the diagnostic studies and treatment options involved with obstructive sleep apnea      -  I also discussed in depth the risk of leaving sleep apnea untreated including hypertension, heart failure, arrhythmia, MI and stroke  -  The patient is agreeable to undergo testing and treatment of obstructive sleep apnea  He understands that pitfalls she may encounter along the way and is willing to attempt CPAP treatment  2   Parasomnia - infrequent episodes of sleep paralysis and hypnagogic hallucinations  No cataplexy reported          -  will check an extended montage sleep study to ensure that there is no evidence of seizure activity or abnormal behavior in sleep  3   Tremor - it appears more likely myoclonic type activity intermittently/anxiety       -  We will optimize his sleep as above       -  he is being managed by neurology  He was given topamax, primidone but he is not using them  HPI:  Gilda Horowitz is a 34 y o  male with PMHx as below who comes in for evaluation of snoring, daytime sleepiness  Patient notes weight gain and symptoms of difficulty falling asleep, snoring, some daytime sleepiness despikte an [place] score of 5 and witnessed apnea  He denies awakenings with gasping, morning headaches, awakenings with dry mouth, memory or concentration issues     he denies symptoms of restless legs  he denies symptoms of cataplexy, sleep paralysis, hypnopompic or hypnagogic hallucinations  Sleep History:  he goes to bed at approximately 10 pm, will get to sleep in a few min, will get out of bed at 7 am   he does not get up through th enight    he does nap twice a week  The naps are 30 min in duration and I somewhat refreshing    Vitals:    09/12/22 0855   BP: 100/68   Pulse: 63   Resp: 16   Temp: 97 8 °F (36 6 °C)   SpO2: 96%   RA  General:  Patient is awake, alert, non-toxic and in no acute respiratory distress  Eyes: PERRL, no scleral icterus  Neck: No JVD  CV:  Regular, +S1 and S2, No murmurs, gallops or rubs appreciated  Lungs: Clear to auscultation bilateral without wheeze, rales or rhonci  Abdomen: Soft, +BS, Non-tender, non-distended  Extremities: No clubbing, cyanosis or edema  Neuro: No focal motor/sensory deficits  Skin: Warm, No rashes or ulcerations  Lab Results   Component Value Date    WBC 9 71 04/13/2022    HGB 15 8 04/13/2022    HCT 48 2 04/13/2022    MCV 90 04/13/2022     04/13/2022     Lab Results   Component Value Date    SODIUM 136 04/13/2022    K 4 2 04/13/2022     04/13/2022    CO2 26 04/13/2022    BUN 14 04/13/2022    CREATININE 1 10 04/13/2022    GLUC 85 03/13/2019 CALCIUM 9 6 04/13/2022

## 2022-09-12 NOTE — PROGRESS NOTES
Sleep Consultation   Bowen Andersen 34 y o  male MRN: [id number]      Reason for consultation: Snoring, fatigue    Requesting physician: Lester Lindsay MD    Assessment/Plan     34 y o  male past medical history benign essential tremor presents for evaluation of snoring  1  Snoring/witnessed apneas   [place] score of 5   BMI 25 7, Mallampati 4Neck Circumference: 15 5  He is at risk for obstructive sleep apnea based on STOP BANG survey, as he has snoring, has excessive tiredness, male gender,  I discussed in depth the diagnostic studies and treatment options involved with obstructive sleep apnea  I also discussed in depth the risk of leaving sleep apnea untreated including hypertension, heart failure, arrhythmia, MI and stroke  The patient is agreeable to undergo testing and treatment of obstructive sleep apnea  He understands the pitfalls he may encounter along the way and is willing to attempt CPAP treatment  Plan   Ordered extended diagnostic polysomnogram  Follow-up after results     2  Parasomnia  Patient states there were regular episodes of sleep paralysis when he was younger, last episode of sleep paralysis 2-3 years ago   Denies cataplexy   Relates associated episodes of hallucinations where he sees movement and hears things that on their  Denies episodes of inappropriate sleep  Patient does have daytime sleepiness and takes a nap twice a week    Plan  Ordered extended diagnostic polysomnogram as patient may have coexistent parasomnia possible narcolepsy  Patient may need MSLT if any concerning findings  Follow-up after results          History of Present Illness   HPI:  Bowen Andersen is a 34 y o  male past medical history benign essential tremor presents for evaluation of snoring  Patient has snored his whole life  His wife says that he snores loudly  Snoring is worse when he is sleeping on his back  His wife has seen episodes where he stops breathing and gasping for air    He has some daytime sleepiness and takes naps twice a week  [place] score of 5  He also complains of daytime fatigue that is been going on for the past 10 years  He is being worked up for the fatigue by his primary care physician  He recently had Lyme and HIV test which were negative  He also has blood pressure on the lower side and has frequent episodes of lightheadedness upon standing  He states they have been occurring since childhood  He wakes up with dry mouth  He states he has unrefreshed sleep  Patient reports episodes of sleep paralysis in the past   He states the last 1 was a couple years ago  They used to occur regularly as a teenager but not as much recently  He states that during these episodes he would see movement or hear things that were not there  Sleep history: Patient goes to bed at 10:00 p m  and falls asleep by 11:00 p m  He wakes up at 7:00 a m   No nighttime awakenings  He takes 2 naps a week, usually in the afternoon  He takes no medications to help him sleep  He drinks 20 oz cough coffee daily, rarely drinks alcohol, smokes 3 cigars a week  He uses marijuana vape 1-2 puffs after work daily      Review of Systems      Genitourinary none   Cardiology none   Gastrointestinal none   Neurology frequent headaches   Constitutional none   Integumentary none   Psychiatry anxiety   Musculoskeletal none   Pulmonary snoring   ENT ringing in ears   Endocrine none   Hematological none               Historical Information   No past medical history on file    Past Surgical History:   Procedure Laterality Date    FACIAL/NECK BIOPSY N/A 9/19/2019    Procedure: NECK LESION/MASS EXCISION BIOPSY;  Surgeon: Chin Douglass DO;  [facility];  Service: General    MASS EXCISION Left 9/19/2019    Procedure: FOREARM LESION EXCISION BIOPSY;  Surgeon: Chin Douglass DO;  [facility];  Service: General    [place] KNEE SCOPE,MED/LAT MENISECTOMY Left 3/28/2019    Procedure: LEFT KNEE ARTHROSCOPIC PARTIAL MEDIAL MENISCECTOMY;  Surgeon: Debo Cheek MD;  [facility];  Service: Orthopedics    WISDOM TOOTH EXTRACTION       Family History   Problem Relation Age of Onset    No Known Problems Mother     Hypertension Father    Clifm Vahid Lupus Father     No Known Problems Sister     No Known Problems Sister     Leukemia Maternal Grandfather     Leukemia Cousin      Social History     Socioeconomic History    Marital status: Single     Spouse name: Not on file    Number of children: Not on file    Years of education: Not on file    Highest education level: Not on file   Occupational History    Not on file   Tobacco Use    Smoking status: Current Every Day Smoker     Years: 2 00     Types: Cigars    Smokeless tobacco: Never Used    Tobacco comment: cigars <1 then one a day   Vaping Use    Vaping Use: Never used   Substance and Sexual Activity    Alcohol use: Yes     Comment: Socially    Drug use: Yes     Types: Marijuana     Comment: 3-4 times a week    Sexual activity: Yes     Partners: Female     Birth control/protection: None   Other Topics Concern    Not on file   Social History Narrative    Title work     Social Determinants of Health     Financial Resource Strain: Not on file   Food Insecurity: Not on file   Transportation Needs: Not on file   Physical Activity: Not on file   Stress: Not on file   Social Connections: Not on file   Intimate Partner Violence: Not on file   Housing Stability: Not on file       Occupational History:     Meds/Allergies   No Known Allergies    Home medications:  Prior to Admission medications    Medication Sig Start Date End Date Taking? Authorizing Provider   primidone (MYSOLINE) 50 mg tablet Take 0 5 tablets (25 mg total) by mouth daily at bedtime After 1 week, can increase to 1 tablet at bedtime  After 2 weeks, can increase to 1 5 tablets at bedtime    Patient not taking: No sig reported 4/25/22 10/22/22  Sourav Monterroso MD   topiramate (Topamax) 25 mg tablet Take 1 tablet (25 mg total) by mouth daily  Patient not taking: Reported on 9/12/2022 8/1/22   Mando Lopez MD       Vitals:   Blood pressure 100/68, pulse 63, temperature 97 8 °F (36 6 °C), resp  rate 16, height 6' 1" (1 854 m), weight 88 5 kg (195 lb), SpO2 96 %  Body mass index is 25 73 kg/m²  Neck Circumference: 15 5    Physical Exam  General: Awake alert and oriented x 3, conversant without conversational dyspnea, NAD, normal affect  HEENT:  PERRL, Sclera noninjected, nonicteric OU, Nares patent,  no craniofacial abnormalities, Mucous membranes, moist, no oral lesions, normal dentition, Mallampati class 4, Enlarged uvula  NECK:  Trachea midline, no accessory muscle use, no stridor, no cervical or supraclavicular adenopathy, JVP not elevated  CARDIAC: Reg, single s1/S2, no m/r/g  PULM: CTA bilaterally no wheezing, rhonchi or rales  EXT: No cyanosis, no clubbing, no edema, normal capillary refill  NEURO: no focal neurologic deficits, AAOx3, moving all extremities appropriately    Labs: I have personally reviewed pertinent lab results    Lab Results   Component Value Date    WBC 9 71 04/13/2022    HGB 15 8 04/13/2022    HCT 48 2 04/13/2022    MCV 90 04/13/2022     04/13/2022      Lab Results   Component Value Date    CALCIUM 9 6 04/13/2022    K 4 2 04/13/2022    CO2 26 04/13/2022     04/13/2022    BUN 14 04/13/2022    CREATININE 1 10 04/13/2022     No results found for: IRON, TIBC, FERRITIN  Lab Results   Component Value Date    [id number] 240 04/13/2022     No results found for: FOLATE      Arterial Blood Gas result: NA    Sleep studies:  Diagnostic:NA  Titration:  Split:    Compliance Data:  MD Teri Braun's Sleep Fellow

## 2022-09-16 ENCOUNTER — TELEPHONE (OUTPATIENT)
Dept: PULMONOLOGY | Facility: CLINIC | Age: 32
End: 2022-09-16

## 2022-09-16 DIAGNOSIS — R06.83 SNORING: Primary | ICD-10-CM

## 2022-12-05 ENCOUNTER — TELEPHONE (OUTPATIENT)
Dept: PULMONOLOGY | Facility: CLINIC | Age: 32
End: 2022-12-05

## 2022-12-05 NOTE — TELEPHONE ENCOUNTER
Called pt about f/u with Dr Jammie Schwab and sleep study not done, pt stated he was going to forgo all of the appts with sleep

## 2024-03-15 ENCOUNTER — CONSULT (OUTPATIENT)
Dept: UROLOGY | Facility: CLINIC | Age: 34
End: 2024-03-15
Payer: COMMERCIAL

## 2024-03-15 VITALS
RESPIRATION RATE: 12 BRPM | BODY MASS INDEX: 29.42 KG/M2 | HEIGHT: 73 IN | SYSTOLIC BLOOD PRESSURE: 118 MMHG | WEIGHT: 222 LBS | OXYGEN SATURATION: 95 % | HEART RATE: 75 BPM | DIASTOLIC BLOOD PRESSURE: 60 MMHG

## 2024-03-15 DIAGNOSIS — Z30.2 ENCOUNTER FOR STERILIZATION: Primary | ICD-10-CM

## 2024-03-15 PROCEDURE — 99203 OFFICE O/P NEW LOW 30 MIN: CPT | Performed by: PHYSICIAN ASSISTANT

## 2024-03-15 RX ORDER — DIAZEPAM 5 MG/1
TABLET ORAL
Qty: 2 TABLET | Refills: 0 | Status: SHIPPED | OUTPATIENT
Start: 2024-03-15

## 2024-03-15 NOTE — PROGRESS NOTES
Referring Physician: Nadiya George MD  A copy of this consultation note was communicated to the referring physician.       Diagnoses and all orders for this visit:    Encounter for sterilization  -     diazepam (VALIUM) 5 mg tablet; Take both tablets 1 hour prior to the procedure            Assessment and plan:       We had a long discussion regarding the options for birth control.  I told the patient that vasectomy is considered to be a permanent surgical sterilization procedure.  We spoke about other options including the possibility of vasectomy reversal at a later time.  He understands that vasectomy confers no immunity to STDs.  I also told him that according to our present knowledge, there is no causal relationship between vasectomy and subsequent development of prostate cancer or testicular cancer.  No change in libido erection or ejaculation.    We spoke about the potential complications.  The most common one in the short term is scrotal hematoma and infection, which rarely requires re-operation.  Additionally, he can react to the anesthetic, develop scrotal swelling, have pain or skin bruising.  We spoke about post procedure care to try to minimize this complication.  I also asked him to refrain from aspirin or fish oil products and alcohol prior to the procedure.  The long-term complications include but are not limited to vasectomy failure by recanalization, chronic epididymal discomfort, pain, among other possibilities.    I described to him how this procedure is normally performed in an office setting and he understands that if anesthesia is desired, this can be performed for him in an outpatient operative setting.  Finally, he understands that following vasectomy, he’ll need to use other means of birth control until he’s had semen analyses that demonstrate the absence of sperm.  He understands it will be his responsibility to submit these semen specimens and call our office for the results. I told him  again that recanalization is a small but real possibility, and if he is ever concerned about it he can submit another semen specimen for analysis.      After discussing the risks, benefits, possible complications and alternatives, informed consents were obtained.  Diazepam was prescribed, and review dosing, adverse effects and timing of the procedure.  He will return in the near future for the procedure.            Chief Complaint     Desire for vasectomy      History of Present Illness     Quique Long is a 34 y.o. male referred for evaluation of vasectomy.    He has 1 biological children.  He states that he and his partner have come to the mutual decision they do not desire any additional children.    He has no prior past medical, past surgical, or past urologic history    Detailed Urologic History     - please refer to HPI    Review of Systems     Review of Systems   Constitutional: Negative for activity change and fatigue.   HENT: Negative for congestion.    Eyes: Negative for visual disturbance.   Respiratory: Negative for shortness of breath and wheezing.    Cardiovascular: Negative for chest pain and leg swelling.   Gastrointestinal: Negative for abdominal pain.   Endocrine: Negative for polyuria.   Genitourinary: Negative for dysuria, flank pain, hematuria and urgency.   Musculoskeletal: Negative for back pain.   Allergic/Immunologic: Negative for immunocompromised state.   Neurological: Negative for dizziness and numbness.   Psychiatric/Behavioral: Negative for dysphoric mood.   All other systems reviewed and are negative.        Allergies     No Known Allergies    Physical Exam     Physical Exam   Constitutional: He is oriented to person, place, and time. He appears well-developed and well-nourished. No distress.   HENT:   Head: Normocephalic and atraumatic.   Eyes: EOM are normal.   Neck: Normal range of motion.   Cardiovascular:   Negative lower extremity edema   Pulmonary/Chest: Effort normal and breath  sounds normal.   Abdominal: Soft.   Genitourinary:   Genitourinary Comments: Vas deferens are palpable bilaterally.   Musculoskeletal: Normal range of motion.   Neurological: He is alert and oriented to person, place, and time.   Skin: Skin is warm.   Psychiatric: He has a normal mood and affect. His behavior is normal.         Vital Signs  There were no vitals filed for this visit.      Current Medications       Current Outpatient Medications:     oxyCODONE-acetaminophen (Percocet) 5-325 mg per tablet, Take 1 tablet by mouth every 4 (four) hours as needed for moderate pain Max Daily Amount: 6 tablets (Patient not taking: Reported on 7/28/2023), Disp: 15 tablet, Rfl: 0      Active Problems     Patient Active Problem List   Diagnosis    Benign essential tremor    Snoring    Overweight (BMI 25.0-29.9)    Tobacco use    Arm skin lesion, left    Dermoid cyst of neck    Dizziness    Fatigue    Screening for HIV (human immunodeficiency virus)    Mixed hyperlipidemia         Past Medical History     Past Medical History:   Diagnosis Date    Ear problems     HL (hearing loss)     Sleep difficulties     Tinnitus          Surgical History     Past Surgical History:   Procedure Laterality Date    FACIAL/NECK BIOPSY N/A 09/19/2019    Procedure: NECK LESION/MASS EXCISION BIOPSY;  Surgeon: Edward Lara DO;  Location: [facility];  Service: General    MASS EXCISION Left 09/19/2019    Procedure: FOREARM LESION EXCISION BIOPSY;  Surgeon: Edward Lara DO;  Location: [facility];  Service: General    UT ARTHRS KNE SURG W/MENISCECTOMY MED/LAT W/SHVG Left 03/28/2019    Procedure: LEFT KNEE ARTHROSCOPIC PARTIAL MEDIAL MENISCECTOMY;  Surgeon: Dwayne Burgos MD;  Location: [facility];  Service: Orthopedics    SINUS SURGERY      WISDOM TOOTH EXTRACTION           Family History     Family History   Problem Relation Age of Onset    No Known Problems Mother     Hypertension Father     Lupus Father     No Known Problems Sister     No  "Known Problems Sister     Leukemia Maternal Grandfather     Leukemia Cousin          Social History     Social History     Social History     Tobacco Use   Smoking Status Former    Types: Cigars   Smokeless Tobacco Never   Tobacco Comments    cigars <1 then one a day       Portions of the record may have been created with voice recognition software.  Occasional wrong word or \"sound a like\" substitutions may have occurred due to the inherent limitations of voice recognition software.  Read the chart carefully and recognize, using context, where substitutions have occurred.                                                                                                                                                                                                                                                   "

## 2024-07-08 ENCOUNTER — PROCEDURE VISIT (OUTPATIENT)
Dept: UROLOGY | Facility: CLINIC | Age: 34
End: 2024-07-08
Payer: COMMERCIAL

## 2024-07-08 VITALS
BODY MASS INDEX: 28.63 KG/M2 | DIASTOLIC BLOOD PRESSURE: 76 MMHG | WEIGHT: 216 LBS | HEIGHT: 73 IN | OXYGEN SATURATION: 96 % | HEART RATE: 76 BPM | SYSTOLIC BLOOD PRESSURE: 118 MMHG

## 2024-07-08 DIAGNOSIS — Z30.2 ENCOUNTER FOR STERILIZATION: Primary | ICD-10-CM

## 2024-07-08 PROCEDURE — 88302 TISSUE EXAM BY PATHOLOGIST: CPT | Performed by: PATHOLOGY

## 2024-07-08 PROCEDURE — 55250 REMOVAL OF SPERM DUCT(S): CPT | Performed by: UROLOGY

## 2024-07-08 NOTE — PROGRESS NOTES
Procedures      No Scalpel Vasectomy Procedure Note    Indications: 34 y.o.  y.o. male desiring permanent sterilization    Pre-operative Diagnosis: Undesired fertility    Post-operative Diagnosis: Undesired fertility    Anesthesia: Lidocaine 2% without epinephrine      Procedure Details     The risks and benefits of the procedure were discussed at the pre-procedure consultation, and written, informed consent obtained.    Premedicated with Valium 10 mgm po 60 minutes prior to procedure.    The scrotum was palpated with both testes normal in size and position, no masses palpitated. The scrotum was cleansed with warm Betadine and draped in the usual sterile manner.     A vasal sheath block was performed on both the left and right vas.  After adequate anesthesia was established, a small perforation was made in the skin and the left vas was isolated with the ring forceps, dissected free and delivered through the skin perforation.  The left vas was divided, approximately 1.5 cm portion removed, and each end of the vas was cauterized and suture ligated.  The ends of the vas were replaced in the scrotum through the puncture site.  The right vas was then isolated, divided, cauterized and ligated in a similar fashion.  Midportions removed were sent to pathology to confirm.    Any bleeding was controlled with electrocautery.  The puncture site was dry when the procedure was completed.  After discussion with the patient, the puncture site was sutured using single 5-0 chromic suture in figure 8 fashion.     Specimen:   1.  Right vas deferens  2.  Left vas deferens    Condition: Stable    Complications: none    Plan:        He did very well with the procedure today. Postprocedural instructions were provided, including instructions, scheduling information, and the specimen cup for his postprocedural semenanalysis.  He was instructed to perform this at 6 weeks and 2 call my office after the specimen is submitted to review the  results by phone.  He was instructed to continue his current methods of contraception until that time.

## 2024-07-10 PROCEDURE — 88302 TISSUE EXAM BY PATHOLOGIST: CPT | Performed by: PATHOLOGY

## 2024-10-10 ENCOUNTER — OFFICE VISIT (OUTPATIENT)
Dept: FAMILY MEDICINE CLINIC | Facility: CLINIC | Age: 34
End: 2024-10-10

## 2024-10-10 VITALS
RESPIRATION RATE: 16 BRPM | HEART RATE: 85 BPM | WEIGHT: 216 LBS | BODY MASS INDEX: 28.63 KG/M2 | OXYGEN SATURATION: 98 % | HEIGHT: 73 IN | SYSTOLIC BLOOD PRESSURE: 120 MMHG | DIASTOLIC BLOOD PRESSURE: 78 MMHG

## 2024-10-10 DIAGNOSIS — R06.83 SNORING: ICD-10-CM

## 2024-10-10 DIAGNOSIS — E78.2 MIXED HYPERLIPIDEMIA: ICD-10-CM

## 2024-10-10 DIAGNOSIS — Z00.00 WELL ADULT EXAM: ICD-10-CM

## 2024-10-10 DIAGNOSIS — M25.511 CHRONIC PAIN OF BOTH SHOULDERS: ICD-10-CM

## 2024-10-10 DIAGNOSIS — Z23 NEED FOR VACCINATION: Primary | ICD-10-CM

## 2024-10-10 DIAGNOSIS — G89.29 CHRONIC PAIN OF BOTH SHOULDERS: ICD-10-CM

## 2024-10-10 DIAGNOSIS — D23.71 DERMATOFIBROMA OF CALF, RIGHT: ICD-10-CM

## 2024-10-10 DIAGNOSIS — Z79.899 OTHER LONG TERM (CURRENT) DRUG THERAPY: ICD-10-CM

## 2024-10-10 DIAGNOSIS — E55.9 VITAMIN D DEFICIENCY: ICD-10-CM

## 2024-10-10 DIAGNOSIS — L98.9 SKIN LESION: ICD-10-CM

## 2024-10-10 DIAGNOSIS — M25.512 CHRONIC PAIN OF BOTH SHOULDERS: ICD-10-CM

## 2024-10-10 PROBLEM — D36.7 DERMOID CYST OF NECK: Status: RESOLVED | Noted: 2019-09-09 | Resolved: 2024-10-10

## 2024-10-10 PROBLEM — R53.83 FATIGUE: Status: RESOLVED | Noted: 2022-03-31 | Resolved: 2024-10-10

## 2024-10-10 PROBLEM — G25.0 BENIGN ESSENTIAL TREMOR: Status: RESOLVED | Noted: 2019-02-14 | Resolved: 2024-10-10

## 2024-10-10 PROBLEM — Z11.4 SCREENING FOR HIV (HUMAN IMMUNODEFICIENCY VIRUS): Status: RESOLVED | Noted: 2022-03-31 | Resolved: 2024-10-10

## 2024-10-10 PROBLEM — R42 DIZZINESS: Status: RESOLVED | Noted: 2022-03-31 | Resolved: 2024-10-10

## 2024-10-10 PROBLEM — Z72.0 TOBACCO USE: Status: RESOLVED | Noted: 2019-02-14 | Resolved: 2024-10-10

## 2024-10-10 NOTE — PROGRESS NOTES
Ambulatory Visit  Name: Quique Long      : 1990      MRN: [id number]  Encounter Provider: Danny Luo MD  Encounter Date: 10/10/2024   Encounter department: [facility]      Assessment & Plan  1. Weight management.  His weight has increased from 195 lbs in  to 216 lbs currently. He reports difficulty losing weight despite walking his son daily. He was advised to be mindful of portion sizes and the quality of food intake. It was suggested that he aim to reduce his weight by approximately 10 pounds. Over-the-counter Breathe Right strips with a magnet were recommended to help with his narrow nasal structure. Lab work was ordered to monitor glucose, cholesterol, blood count, kidney function, liver function, and vitamin D levels.    2. Skin lesion.  One lesion is healing, while the other remains fissured and cracked. It does not resemble skin cancer or a viral infection. Options include maintaining a Band-Aid on the lesion 24/7 for two weeks, changing it daily, or using a silicone scar strip, which can be changed weekly. The goal is to retain moisture to soften the lesion.    3. Shoulder pain.  The pain could be due to rotator cuff issues or muscle fatigue. His strength and range of motion are satisfactory. Resistance band exercises were recommended for shoulder strengthening. An x-ray is not deemed necessary at this point.    4. Health Maintenance.  He received a flu shot during the visit.        Assessment & Plan  Need for vaccination    Orders:    influenza vaccine preservative-free 0.5 mL IM (Fluzone, Afluria, Fluarix, Flulaval)    Mixed hyperlipidemia    Orders:    Lipid Panel with Direct LDL reflex    Vitamin D deficiency    Orders:    Vitamin D 25 hydroxy    Other long term (current) drug therapy    Orders:    Hemoglobin A1C    Well adult exam    Orders:    CBC and differential    Lipid Panel with Direct LDL reflex    Comprehensive metabolic panel    Vitamin D 25 hydroxy     Hemoglobin A1C    Snoring         Skin lesion         Dermatofibroma of calf, right         Chronic pain of both shoulders              History of Present Illness     History of Present Illness  The patient presents for a physical.    He reports a weight gain, which he attributes to the birth of his child. His usual weight is around 185 pounds, but he has been struggling to return to this weight. He typically skips breakfast and only starts eating in the afternoon, often consuming food after dinner. Despite daily walks with his son for about an hour, he has not seen any significant weight loss. He does not believe his snoring has increased with his weight gain. He does not experience chest pain, palpitations, coughing, or wheezing. His bowel movements are regular.    He mentions that he had a procedure to correct a deviated septum, but it did not alleviate his snoring. He uses a fitted mouthpiece at night, but often removes it in his sleep.    He has noticed cuts on his leg since spring, which he has been scratching. One of the cuts has started to heal, while the other remains scaly. He believes they have been healing for a few months. He also experiences itching on his leg, which he scratches.    He has been experiencing shoulder discomfort for the past few years. The discomfort is not severe, but his shoulders often feel sore. When he rotates them, he hears creaking and cracking sounds. He is concerned about potential damage as he can no longer keep his arms raised for extended periods. He also experiences tightness when sitting or sleeping in certain positions. He frequently lifts his baby throughout the day. He is right-handed.    He recalls having a similar skin issue on his arm 5 or 6 years ago, which was removed. He also had an epidermal cyst on his neck.    He received a flu shot today.    He had a vasectomy done. He did not go for the post-procedure visit. He will be going next week.    SOCIAL HISTORY  He  "does not use tobacco anymore. He may have a band once every few months.    FAMILY HISTORY  His family is obese. Both his sisters, both his parents, and his grandparents are obese. His mother has dislocated her shoulders a few times. Weak joints run in the family.     Review of Systems   Constitutional:  Negative for activity change, appetite change and fever.   HENT:  Negative for congestion, nosebleeds and trouble swallowing.    Eyes:  Negative for itching.   Respiratory:  Negative for cough and chest tightness.    Cardiovascular:  Negative for chest pain and palpitations.   Gastrointestinal:  Negative for abdominal pain, constipation, diarrhea and nausea.   Endocrine: Negative for cold intolerance.   Genitourinary:  Negative for frequency.   Musculoskeletal:  Negative for gait problem and joint swelling.   Skin:  Negative for rash.   Allergic/Immunologic: Negative for immunocompromised state.   Neurological:  Negative for dizziness, tremors, seizures, syncope and headaches.   Psychiatric/Behavioral:  Negative for hallucinations and suicidal ideas.      Objective     /78   Pulse 85   Resp 16   Ht 6' 1\" (1.854 m)   Wt 98 kg (216 lb)   SpO2 98%   BMI 28.50 kg/m²     Physical Exam  Nose is narrow.  No wheeze in lungs.    Vital Signs  Weight is 216.  Physical Exam  Vitals and nursing note reviewed.   Constitutional:       Appearance: He is well-developed.   HENT:      Head: Normocephalic and atraumatic.   Eyes:      Conjunctiva/sclera: Conjunctivae normal.      Pupils: Pupils are equal, round, and reactive to light.   Cardiovascular:      Rate and Rhythm: Normal rate and regular rhythm.      Heart sounds: Normal heart sounds.   Pulmonary:      Effort: Pulmonary effort is normal.      Breath sounds: Normal breath sounds. No wheezing or rales.   Abdominal:      General: Bowel sounds are normal. There is no distension.      Palpations: Abdomen is soft.      Tenderness: There is no abdominal tenderness. "   Musculoskeletal:         General: No tenderness. Normal range of motion.      Cervical back: Normal range of motion and neck supple.   Skin:     General: Skin is warm and dry.      Findings: No rash.   Neurological:      Mental Status: He is alert and oriented to person, place, and time.      Cranial Nerves: No cranial nerve deficit.      Sensory: No sensory deficit.      Coordination: Coordination normal.   Psychiatric:         Behavior: Behavior normal.         Thought Content: Thought content normal.         Judgment: Judgment normal.

## 2024-10-11 ENCOUNTER — APPOINTMENT (OUTPATIENT)
Dept: LAB | Facility: CLINIC | Age: 34
End: 2024-10-11
Payer: COMMERCIAL

## 2024-10-11 LAB
25(OH)D3 SERPL-MCNC: 19.5 NG/ML (ref 30–100)
ALBUMIN SERPL BCG-MCNC: 4.4 G/DL (ref 3.5–5)
ALP SERPL-CCNC: 68 U/L (ref 34–104)
ALT SERPL W P-5'-P-CCNC: 28 U/L (ref 7–52)
ANION GAP SERPL CALCULATED.3IONS-SCNC: 9 MMOL/L (ref 4–13)
AST SERPL W P-5'-P-CCNC: 20 U/L (ref 13–39)
BASOPHILS # BLD AUTO: 0.06 THOUSANDS/ΜL (ref 0–0.1)
BASOPHILS NFR BLD AUTO: 1 % (ref 0–1)
BILIRUB SERPL-MCNC: 0.71 MG/DL (ref 0.2–1)
BUN SERPL-MCNC: 11 MG/DL (ref 5–25)
CALCIUM SERPL-MCNC: 9.3 MG/DL (ref 8.4–10.2)
CHLORIDE SERPL-SCNC: 103 MMOL/L (ref 96–108)
CHOLEST SERPL-MCNC: 196 MG/DL
CO2 SERPL-SCNC: 24 MMOL/L (ref 21–32)
CREAT SERPL-MCNC: 0.91 MG/DL (ref 0.6–1.3)
EOSINOPHIL # BLD AUTO: 0.24 THOUSAND/ΜL (ref 0–0.61)
EOSINOPHIL NFR BLD AUTO: 3 % (ref 0–6)
ERYTHROCYTE [DISTWIDTH] IN BLOOD BY AUTOMATED COUNT: 12.9 % (ref 11.6–15.1)
EST. AVERAGE GLUCOSE BLD GHB EST-MCNC: 114 MG/DL
GFR SERPL CREATININE-BSD FRML MDRD: 108 ML/MIN/1.73SQ M
GLUCOSE P FAST SERPL-MCNC: 81 MG/DL (ref 65–99)
HBA1C MFR BLD: 5.6 %
HCT VFR BLD AUTO: 48.1 % (ref 36.5–49.3)
HDLC SERPL-MCNC: 34 MG/DL
HGB BLD-MCNC: 15.8 G/DL (ref 12–17)
IMM GRANULOCYTES # BLD AUTO: 0.02 THOUSAND/UL (ref 0–0.2)
IMM GRANULOCYTES NFR BLD AUTO: 0 % (ref 0–2)
LDLC SERPL CALC-MCNC: 138 MG/DL (ref 0–100)
LYMPHOCYTES # BLD AUTO: 3.34 THOUSANDS/ΜL (ref 0.6–4.47)
LYMPHOCYTES NFR BLD AUTO: 37 % (ref 14–44)
MCH RBC QN AUTO: 29.5 PG (ref 26.8–34.3)
MCHC RBC AUTO-ENTMCNC: 32.8 G/DL (ref 31.4–37.4)
MCV RBC AUTO: 90 FL (ref 82–98)
MONOCYTES # BLD AUTO: 0.72 THOUSAND/ΜL (ref 0.17–1.22)
MONOCYTES NFR BLD AUTO: 8 % (ref 4–12)
NEUTROPHILS # BLD AUTO: 4.54 THOUSANDS/ΜL (ref 1.85–7.62)
NEUTS SEG NFR BLD AUTO: 51 % (ref 43–75)
NRBC BLD AUTO-RTO: 0 /100 WBCS
PLATELET # BLD AUTO: 280 THOUSANDS/UL (ref 149–390)
PMV BLD AUTO: 9.3 FL (ref 8.9–12.7)
POTASSIUM SERPL-SCNC: 4.2 MMOL/L (ref 3.5–5.3)
PROT SERPL-MCNC: 7.4 G/DL (ref 6.4–8.4)
RBC # BLD AUTO: 5.36 MILLION/UL (ref 3.88–5.62)
SODIUM SERPL-SCNC: 136 MMOL/L (ref 135–147)
TRIGL SERPL-MCNC: 122 MG/DL
WBC # BLD AUTO: 8.92 THOUSAND/UL (ref 4.31–10.16)

## 2024-10-11 PROCEDURE — 36415 COLL VENOUS BLD VENIPUNCTURE: CPT | Performed by: FAMILY MEDICINE

## 2024-10-11 PROCEDURE — 85025 COMPLETE CBC W/AUTO DIFF WBC: CPT | Performed by: FAMILY MEDICINE

## 2024-10-11 PROCEDURE — 80061 LIPID PANEL: CPT | Performed by: FAMILY MEDICINE

## 2024-10-11 PROCEDURE — 83036 HEMOGLOBIN GLYCOSYLATED A1C: CPT | Performed by: FAMILY MEDICINE

## 2024-10-11 PROCEDURE — 80053 COMPREHEN METABOLIC PANEL: CPT | Performed by: FAMILY MEDICINE

## 2024-10-11 PROCEDURE — 82306 VITAMIN D 25 HYDROXY: CPT | Performed by: FAMILY MEDICINE

## 2024-10-18 ENCOUNTER — APPOINTMENT (OUTPATIENT)
Dept: LAB | Facility: CLINIC | Age: 34
End: 2024-10-18
Payer: COMMERCIAL

## 2024-10-18 DIAGNOSIS — Z30.2 ENCOUNTER FOR STERILIZATION: ICD-10-CM

## 2024-10-18 LAB
DEPRECATED CD4 CELLS/CD8 CELLS BLD: 4 ML
SPERM MOTILE SMN QL MICRO: NORMAL

## 2024-10-18 PROCEDURE — 89321 SEMEN ANAL SPERM DETECTION: CPT

## (undated) DEVICE — SCD SEQUENTIAL COMPRESSION COMFORT SLEEVE MEDIUM KNEE LENGTH: Brand: KENDALL SCD

## (undated) DEVICE — SUT MONOCRYL 3-0 PS-2 18 IN Y497G

## (undated) DEVICE — TUBING SUCTION 5MM X 12 FT

## (undated) DEVICE — BETHLEHEM UNIVERSAL  ARTHRO PK: Brand: CARDINAL HEALTH

## (undated) DEVICE — CHLORAPREP HI-LITE 26ML ORANGE

## (undated) DEVICE — TUBING ARTHROSCOPY REDUCE PATIENT

## (undated) DEVICE — ADHESIVE SKIN HIGH VISCOSITY EXOFIN 1ML

## (undated) DEVICE — SUT SILK 2-0 SH 30 IN K833H

## (undated) DEVICE — BETHLEHEM UNIVERSAL MINOR GEN: Brand: CARDINAL HEALTH

## (undated) DEVICE — GLOVE SRG BIOGEL ORTHOPEDIC 8

## (undated) DEVICE — LIGHT GLOVE GREEN

## (undated) DEVICE — VIAL DECANTER

## (undated) DEVICE — ACE WRAP 6 IN UNSTERILE

## (undated) DEVICE — INTENDED FOR TISSUE SEPARATION, AND OTHER PROCEDURES THAT REQUIRE A SHARP SURGICAL BLADE TO PUNCTURE OR CUT.: Brand: BARD-PARKER ® CARBON RIB-BACK BLADES

## (undated) DEVICE — PADDING CAST 4 IN  COTTON STRL

## (undated) DEVICE — LIGHT HANDLE COVER SLEEVE DISP BLUE STELLAR

## (undated) DEVICE — BLADE SHAVER DISSECTOR 3.5MM 13CM COOLCUT

## (undated) DEVICE — DRAPE EQUIPMENT RF WAND

## (undated) DEVICE — GLOVE INDICATOR PI UNDERGLOVE SZ 8.5 BLUE

## (undated) DEVICE — INTENDED FOR TISSUE SEPARATION, AND OTHER PROCEDURES THAT REQUIRE A SHARP SURGICAL BLADE TO PUNCTURE OR CUT.: Brand: BARD-PARKER SAFETY BLADES SIZE 15, STERILE

## (undated) DEVICE — NEEDLE 22 G X 1 1/2 SAFETY

## (undated) DEVICE — SYRINGE 20ML LL

## (undated) DEVICE — SUT VICRYL 3-0 SH 27 IN J416H

## (undated) DEVICE — 3M™ STERI-STRIP™ REINFORCED ADHESIVE SKIN CLOSURES, R1547, 1/2 IN X 4 IN (12 MM X 100 MM), 6 STRIPS/ENVELOPE: Brand: 3M™ STERI-STRIP™

## (undated) DEVICE — GLOVE SRG BIOGEL 8.5

## (undated) DEVICE — SUT MONOCRYL 4-0 PS-2 18 IN Y496G

## (undated) DEVICE — PLUMEPEN PRO 10FT

## (undated) DEVICE — ALL PURPOSE SPONGES,NON-WOVEN, 4 PLY: Brand: CURITY

## (undated) DEVICE — PAD GROUNDING ADULT

## (undated) DEVICE — IMPERVIOUS STOCKINETTE: Brand: DEROYAL